# Patient Record
Sex: FEMALE | Race: WHITE | NOT HISPANIC OR LATINO | Employment: STUDENT | URBAN - METROPOLITAN AREA
[De-identification: names, ages, dates, MRNs, and addresses within clinical notes are randomized per-mention and may not be internally consistent; named-entity substitution may affect disease eponyms.]

---
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
## 2023-03-14 ENCOUNTER — MYC MEDICAL ADVICE (OUTPATIENT)
Dept: FAMILY MEDICINE | Facility: CLINIC | Age: 18
End: 2023-03-14
Payer: COMMERCIAL

## 2023-03-29 ENCOUNTER — E-VISIT (OUTPATIENT)
Dept: URGENT CARE | Facility: CLINIC | Age: 18
End: 2023-03-29
Payer: COMMERCIAL

## 2023-03-29 DIAGNOSIS — R35.0 URINARY FREQUENCY: Primary | ICD-10-CM

## 2023-03-29 PROCEDURE — 99421 OL DIG E/M SVC 5-10 MIN: CPT | Performed by: PHYSICIAN ASSISTANT

## 2023-03-29 NOTE — PATIENT INSTRUCTIONS
Dear Dulce Sharma,     After reviewing your responses, I would like you to come in for a urine test to make sure we treat you correctly. This urine test is to evaluate you for a possible urinary tract infection, and should be scheduled for today or tomorrow. Schedule a Lab Only appointment here.     Lab appointments are not available at most locations on the weekends. If no Lab Only appointment is available, you should be seen in any of our convenient Walk-in or Urgent Care Centers, which can be found on our website here.     You will receive instructions with your results in [facility] [facility] once they are available.     If your symptoms worsen, you develop pain in your back or stomach, develop fevers, or are not improving in 5 days, please contact your primary care provider for an appointment or visit a Walk-in or Urgent Care Center to be seen.     Thanks again for choosing us as your health care partner,     eKna Quiles PA-C

## 2023-03-30 ENCOUNTER — MYC MEDICAL ADVICE (OUTPATIENT)
Dept: FAMILY MEDICINE | Facility: CLINIC | Age: 18
End: 2023-03-30

## 2023-03-30 ENCOUNTER — LAB (OUTPATIENT)
Dept: LAB | Facility: CLINIC | Age: 18
End: 2023-03-30
Payer: COMMERCIAL

## 2023-03-30 DIAGNOSIS — R35.0 URINARY FREQUENCY: ICD-10-CM

## 2023-03-30 LAB
ALBUMIN UR-MCNC: NEGATIVE MG/DL
APPEARANCE UR: CLEAR
BACTERIA #/AREA URNS HPF: ABNORMAL /HPF
BILIRUB UR QL STRIP: NEGATIVE
CLUE CELLS: ABNORMAL
COLOR UR AUTO: YELLOW
GLUCOSE UR STRIP-MCNC: NEGATIVE MG/DL
HGB UR QL STRIP: ABNORMAL
KETONES UR STRIP-MCNC: NEGATIVE MG/DL
LEUKOCYTE ESTERASE UR QL STRIP: ABNORMAL
NITRATE UR QL: NEGATIVE
PH UR STRIP: 6 [PH] (ref 5–8)
RBC #/AREA URNS AUTO: ABNORMAL /HPF
SP GR UR STRIP: 1.01 (ref 1–1.03)
SQUAMOUS #/AREA URNS AUTO: ABNORMAL /LPF
TRICHOMONAS, WET PREP: ABNORMAL
UROBILINOGEN UR STRIP-ACNC: 0.2 E.U./DL
WBC #/AREA URNS AUTO: ABNORMAL /HPF
WBC'S/HIGH POWER FIELD, WET PREP: ABNORMAL
YEAST, WET PREP: ABNORMAL

## 2023-03-30 PROCEDURE — 87210 SMEAR WET MOUNT SALINE/INK: CPT | Performed by: PHYSICIAN ASSISTANT

## 2023-03-30 PROCEDURE — 87591 N.GONORRHOEAE DNA AMP PROB: CPT

## 2023-03-30 PROCEDURE — 87491 CHLMYD TRACH DNA AMP PROBE: CPT

## 2023-03-30 PROCEDURE — 81001 URINALYSIS AUTO W/SCOPE: CPT

## 2023-03-30 ASSESSMENT — ASTHMA QUESTIONNAIRES
QUESTION_2 LAST FOUR WEEKS HOW OFTEN HAVE YOU HAD SHORTNESS OF BREATH: ONCE OR TWICE A WEEK
QUESTION_5 LAST FOUR WEEKS HOW WOULD YOU RATE YOUR ASTHMA CONTROL: WELL CONTROLLED
ACT_TOTALSCORE: 21
ACT_TOTALSCORE: 21
QUESTION_3 LAST FOUR WEEKS HOW OFTEN DID YOUR ASTHMA SYMPTOMS (WHEEZING, COUGHING, SHORTNESS OF BREATH, CHEST TIGHTNESS OR PAIN) WAKE YOU UP AT NIGHT OR EARLIER THAN USUAL IN THE MORNING: NOT AT ALL
QUESTION_1 LAST FOUR WEEKS HOW MUCH OF THE TIME DID YOUR ASTHMA KEEP YOU FROM GETTING AS MUCH DONE AT WORK, SCHOOL OR AT HOME: NONE OF THE TIME
QUESTION_4 LAST FOUR WEEKS HOW OFTEN HAVE YOU USED YOUR RESCUE INHALER OR NEBULIZER MEDICATION (SUCH AS ALBUTEROL): TWO OR THREE TIMES PER WEEK

## 2023-03-31 LAB
C TRACH DNA SPEC QL NAA+PROBE: NEGATIVE
N GONORRHOEA DNA SPEC QL NAA+PROBE: NEGATIVE

## 2023-04-06 ENCOUNTER — OFFICE VISIT (OUTPATIENT)
Dept: FAMILY MEDICINE | Facility: CLINIC | Age: 18
End: 2023-04-06
Payer: COMMERCIAL

## 2023-04-06 VITALS
TEMPERATURE: 98.2 F | OXYGEN SATURATION: 98 % | BODY MASS INDEX: 20.66 KG/M2 | HEART RATE: 58 BPM | WEIGHT: 144.3 LBS | DIASTOLIC BLOOD PRESSURE: 58 MMHG | SYSTOLIC BLOOD PRESSURE: 92 MMHG | RESPIRATION RATE: 20 BRPM | HEIGHT: 70 IN

## 2023-04-06 DIAGNOSIS — L70.0 ACNE VULGARIS: Primary | ICD-10-CM

## 2023-04-06 PROCEDURE — 99213 OFFICE O/P EST LOW 20 MIN: CPT | Performed by: FAMILY MEDICINE

## 2023-04-06 RX ORDER — TRETINOIN 0.25 MG/G
CREAM TOPICAL AT BEDTIME
Qty: 20 G | Refills: 1 | Status: SHIPPED | OUTPATIENT
Start: 2023-04-06 | End: 2024-02-13

## 2023-04-06 ASSESSMENT — PAIN SCALES - GENERAL: PAINLEVEL: NO PAIN (0)

## 2023-04-06 NOTE — PROGRESS NOTES
A/P:      ICD-10-CM    1. Acne vulgaris  L70.0 tretinoin (RETIN-A) 0.025 % external cream        Reviewed management.  Advised pt to continue current nonfoaming cleanser and moisturizer.  Plan to stop benzoyl peroxide product and begin retin A.  Reviewed that can be 8 weeks for full effect and some people will noticed worsening (increased redness, dryness, irritation) when first starting.  Reviewed importance of sunscreen and moisture.    Plan f/u 8 weeks, sooner if needed      Subjective   arpita is a 17 year old, presenting for the following health issues:  Derm Problem        4/6/2023     4:13 PM   Additional Questions   Roomed by Gladis WISDOM     History of Present Illness       Reason for visit:  New skin concerns and a growing mole    She eats 2-3 servings of fruits and vegetables daily.She consumes 2 sweetened beverage(s) daily.She exercises with enough effort to increase her heart rate 60 or more minutes per day.  She exercises with enough effort to increase her heart rate 5 days per week. She is missing 1 dose(s) of medications per week.  She is not taking prescribed medications regularly due to remembering to take.       Concern - Skin concerns   Onset: acne on face   Description: Used to be on the pill and in November 2022 switched to IUD, she has noticed more acne on her face.  Also  Has a mole on her lower right cheek she would like checked out.   Intensity: moderate  Progression of Symptoms:  same and constant  Accompanying Signs & Symptoms: none  Previous history of similar problem: none  Precipitating factors:        Worsened by: none  Alleviating factors:        Improved by: none   Therapies tried and outcome: cera ve cleanser, panoxyl cleanser have not helped     Breakouts are mostly bumpy/rough.  No red, inflamed or painful nodules.  Only on face, no trouble on chest or back      Review of Systems         Objective    BP 92/58   Pulse 58   Temp 98.2  F (36.8  C) (Tympanic)   Resp 20   Ht 1.784 m  "(5' 10.25\")   Wt 65.5 kg (144 lb 4.8 oz)   LMP 03/11/2023 (Exact Date)   SpO2 98%   BMI 20.56 kg/m    Body mass index is 20.56 kg/m .  Physical Exam   PE:  VS as above   Gen:  WN/WD/WH female in NAD   SKin:  Multiple closed comedones on forehead primarily, no inflamed or pustular lesions noted.    Epic reviewed                "

## 2023-06-26 ENCOUNTER — PATIENT OUTREACH (OUTPATIENT)
Dept: CARE COORDINATION | Facility: CLINIC | Age: 18
End: 2023-06-26
Payer: COMMERCIAL

## 2023-07-10 ENCOUNTER — PATIENT OUTREACH (OUTPATIENT)
Dept: CARE COORDINATION | Facility: CLINIC | Age: 18
End: 2023-07-10
Payer: COMMERCIAL

## 2023-07-31 ENCOUNTER — E-VISIT (OUTPATIENT)
Dept: FAMILY MEDICINE | Facility: CLINIC | Age: 18
End: 2023-07-31
Payer: COMMERCIAL

## 2023-07-31 DIAGNOSIS — J45.909 MILD ASTHMA WITHOUT COMPLICATION, UNSPECIFIED WHETHER PERSISTENT: Primary | ICD-10-CM

## 2023-07-31 PROCEDURE — 99207 PR NON-BILLABLE SERV PER CHARTING: CPT | Performed by: FAMILY MEDICINE

## 2023-08-01 ENCOUNTER — VIRTUAL VISIT (OUTPATIENT)
Dept: FAMILY MEDICINE | Facility: CLINIC | Age: 18
End: 2023-08-01
Payer: COMMERCIAL

## 2023-08-01 DIAGNOSIS — J45.30 MILD PERSISTENT ASTHMA, UNSPECIFIED WHETHER COMPLICATED: ICD-10-CM

## 2023-08-01 PROCEDURE — 99213 OFFICE O/P EST LOW 20 MIN: CPT | Mod: VID | Performed by: FAMILY MEDICINE

## 2023-08-01 RX ORDER — FLUTICASONE PROPIONATE 50 MCG
1 BLISTER, WITH INHALATION DEVICE INHALATION EVERY 12 HOURS
Qty: 60 EACH | Refills: 0 | Status: SHIPPED | OUTPATIENT
Start: 2023-08-01

## 2023-08-01 RX ORDER — ALBUTEROL SULFATE 90 UG/1
2 AEROSOL, METERED RESPIRATORY (INHALATION) EVERY 6 HOURS PRN
Qty: 18 G | Refills: 2 | Status: SHIPPED | OUTPATIENT
Start: 2023-08-01

## 2023-08-01 ASSESSMENT — ASTHMA QUESTIONNAIRES: ACT_TOTALSCORE: 21

## 2023-08-01 NOTE — LETTER
My Asthma Action Plan    Name: Dulce Sharma   YOB: 2005  Date: 8/1/2023   My doctor: Samantha Boone, DO   My clinic: [facility]        My Rescue Medicine:   Albuterol inhaler (Proair/Ventolin/Proventil HFA)  2-4 puffs EVERY 4 HOURS as needed. Use a spacer if recommended by your provider.   My Asthma Severity:   Intermittent / Exercise Induced  Know your asthma triggers:   exercise or sports          GREEN ZONE   Good Control  I feel good  No cough or wheeze  Can work, sleep and play without asthma symptoms       Take your asthma control medicine every day.     If exercise triggers your asthma, take your rescue medication  15 minutes before exercise or sports, and  During exercise if you have asthma symptoms  Spacer to use with inhaler: If you have a spacer, make sure to use it with your inhaler             YELLOW ZONE Getting Worse  I have ANY of these:  I do not feel good  Cough or wheeze  Chest feels tight  Wake up at night   Keep taking your Green Zone medications  Start taking your rescue medicine:  every 20 minutes for up to 1 hour. Then every 4 hours for 24-48 hours.  If you stay in the Yellow Zone for more than 12-24 hours, contact your doctor.  If you do not return to the Green Zone in 12-24 hours or you get worse, start taking your oral steroid medicine if prescribed by your provider.           RED ZONE Medical Alert - Get Help  I have ANY of these:  I feel awful  Medicine is not helping  Breathing getting harder  Trouble walking or talking  Nose opens wide to breathe       Take your rescue medicine NOW  If your provider has prescribed an oral steroid medicine, start taking it NOW  Call your doctor NOW  If you are still in the Red Zone after 20 minutes and you have not reached your doctor:  Take your rescue medicine again and  Call 911 or go to the emergency room right away    See your regular doctor within 2 weeks of an Emergency Room or Urgent Care visit for  follow-up treatment.          Annual Reminders:  Meet with Asthma Educator,  Flu Shot in the Fall, consider Pneumonia Vaccination for patients with asthma (aged 19 and older).    Pharmacy:    [facility] [place] [place] [address] [facility] [place], [place] - [address] [address]    Electronically signed by Samantha Boone,    Date: 08/01/23                    Asthma Triggers  How To Control Things That Make Your Asthma Worse    Triggers are things that make your asthma worse.  Look at the list below to help you find your triggers and   what you can do about them. You can help prevent asthma flare-ups by staying away from your triggers.      Trigger                                                          What you can do   Cigarette Smoke  Tobacco smoke can make asthma worse. Do not allow smoking in your home, car or around you.  Be sure no one smokes at a child s day care or school.  If you smoke, ask your health care provider for ways to help you quit.  Ask family members to quit too.  Ask your health care provider for a referral to Quit Plan to help you quit smoking, or call 3-530-527-PLAN.     Colds, Flu, Bronchitis  These are common triggers of asthma. Wash your hands often.  Don t touch your eyes, nose or mouth.  Get a flu shot every year.     Dust Mites  These are tiny bugs that live in cloth or carpet. They are too small to see. Wash sheets and blankets in hot water every week.   Encase pillows and mattress in dust mite proof covers.  Avoid having carpet if you can. If you have carpet, vacuum weekly.   Use a dust mask and HEPA vacuum.   Pollen and Outdoor Mold  Some people are allergic to trees, grass, or weed pollen, or molds. Try to keep your windows closed.  Limit time out doors when pollen count is high.   Ask you health care provider about taking medicine during allergy season.     Animal Dander  Some people are allergic to skin flakes, urine or saliva from pets  with fur or feathers. Keep pets with fur or feathers out of your home.    If you can t keep the pet outdoors, then keep the pet out of your bedroom.  Keep the bedroom door closed.  Keep pets off cloth furniture and away from stuffed toys.     Mice, Rats, and Cockroaches  Some people are allergic to the waste from these pests.   Cover food and garbage.  Clean up spills and food crumbs.  Store grease in the refrigerator.   Keep food out of the bedroom.   Indoor Mold  This can be a trigger if your home has high moisture. Fix leaking faucets, pipes, or other sources of water.   Clean moldy surfaces.  Dehumidify basement if it is damp and smelly.   Smoke, Strong Odors, and Sprays  These can reduce air quality. Stay away from strong odors and sprays, such as perfume, powder, hair spray, paints, smoke incense, paint, cleaning products, candles and new carpet.   Exercise or Sports  Some people with asthma have this trigger. Be active!  Ask your doctor about taking medicine before sports or exercise to prevent symptoms.    Warm up for 5-10 minutes before and after sports or exercise.     Other Triggers of Asthma  Cold air:  Cover your nose and mouth with a scarf.  Sometimes laughing or crying can be a trigger.  Some medicines and food can trigger asthma.

## 2023-08-01 NOTE — LETTER
2023      To whom it may concern,       Dulce Sharma, AVIVA 2005, is currently under my medical care for treatment of her asthma.  She was diagnosed with exercise induced asthma based on symptoms in .  Since then she has used albuterol prior to participation in sports with good control of her exercise induced symptoms.       She currently uses 2-4 puffs of albuterol 30 minutes prior to play.       Please let us know if additional information is needed.    Sincerely,       Dr. Samantha Boone DO

## 2023-08-01 NOTE — PROGRESS NOTES
arpita is a 17 year old who is being evaluated via a billable video visit.      How would you like to obtain your AVS? [employer]  If the video visit is dropped, the invitation should be resent by: Text to cell phone: [phone]  Will anyone else be joining your video visit? No          A/P:      ICD-10-CM    1. Mild persistent asthma, unspecified whether complicated  J45.30 fluticasone (FLOVENT DISKUS) 50 MCG/ACT inhaler     albuterol (VENTOLIN HFA) 108 (90 Base) MCG/ACT inhaler        Perhaps has incomplete relief of symptoms with albuterol pre-exertion.  Seemed to do better on low dose controller last year.  Plan trial of low dose ICS and pre-exertion albuterol.  Follow-up in 1 month with update through [employer]    Sports letter completed.      Subjective   arpita is a 17 year old, presenting for the following health issues:  Asthma      History of Present Illness     Asthma:  She presents for follow up of asthma.  She has no cough, no wheezing, and no shortness of breath.  She is using a relief medication a few times a month. She does not miss any doses of her controller medication throughout the week. Patient is aware of the following triggers: exercise or sports and humidity. The patient has not had a visit to the Emergency Room, Urgent Care or Hospital due to asthma since the last clinic visit.     She eats 2-3 servings of fruits and vegetables daily.She consumes 0 sweetened beverage(s) daily.She exercises with enough effort to increase her heart rate 30 to 60 minutes per day.  She exercises with enough effort to increase her heart rate 5 days per week. She is missing 1 dose(s) of medications per week.  She is not taking prescribed medications regularly due to remembering to take.       Diagnosed in 2016 with exercise induced asthma.  Did not have formal testing done.  Had symptoms with exercise that resolved with albuterol prior to exercise    Had a flovent prescription last year as she was having issues  with SOB outside of exercise.  Has since run out of that medicine.  Has not noticed any recent breathing issues off the flovent.  Has been off controller medicine since December.    Uses albuterol 2 puffs every time she exercises.  If she knows she is going to play for 90 minutes she might do 2 puffs early and then 2 puffs 30 minutes prior to try to maximize benefit    Still feels like she has some symptoms if she is really exerting herself.  Did feel like the flovent really helped decrease exercise symptoms as well.  Would be interested in restarting the flovent      Review of Systems         Objective           Vitals:  No vitals were obtained today due to virtual visit.    Physical Exam   GENERAL: Healthy, alert and no distress  EYES: Eyes grossly normal to inspection.  No discharge or erythema, or obvious scleral/conjunctival abnormalities.  RESP: No audible wheeze, cough, or visible cyanosis.  No visible retractions or increased work of breathing.    SKIN: Visible skin clear. No significant rash, abnormal pigmentation or lesions.  NEURO: Cranial nerves grossly intact.  Mentation and speech appropriate for age.  PSYCH: Mentation appears normal, affect normal/bright, judgement and insight intact, normal speech and appearance well-groomed.    Epic reviewed            Video-Visit Details    Type of service:  Video Visit     Originating Location (pt. Location): Other work    Distant Location (provider location):  On-site  Platform used for Video Visit: Haydee

## 2023-08-31 DIAGNOSIS — F41.1 GAD (GENERALIZED ANXIETY DISORDER): ICD-10-CM

## 2023-08-31 NOTE — TELEPHONE ENCOUNTER
"Routing refill request to provider for review/approval because:  A break in medication        Requested Prescriptions   Pending Prescriptions Disp Refills    escitalopram (LEXAPRO) 10 MG tablet [Pharmacy Med Name: ESCITALOPRAM OXALATE 10MG TABS] 90 tablet 1     Sig: TAKE ONE TABLET BY MOUTH ONCE DAILY       SSRIs Protocol Passed - 8/31/2023  8:55 AM        Passed - Recent (12 mo) or future (30 days) visit within the authorizing provider's specialty     Patient has had an office visit with the authorizing provider or a provider within the authorizing providers department within the previous 12 mos or has a future within next 30 days. See \"Patient Info\" tab in inbasket, or \"Choose Columns\" in Meds & Orders section of the refill encounter.              Passed - Medication is active on med list        Passed - Patient is age 18 or older        Passed - No active pregnancy on record        Passed - No positive pregnancy test in last 12 months                 Sofi Carey RN 08/31/23 12:50 PM   "

## 2023-09-01 RX ORDER — ESCITALOPRAM OXALATE 10 MG/1
TABLET ORAL
Qty: 90 TABLET | Refills: 0 | Status: SHIPPED | OUTPATIENT
Start: 2023-09-01 | End: 2024-02-13

## 2023-09-01 NOTE — TELEPHONE ENCOUNTER
Pt states she wasn't taking the med between April and may but has now been taking it regularly since June.  Lori Brewer on 9/1/2023 at 10:11 AM

## 2023-09-01 NOTE — TELEPHONE ENCOUNTER
RN tried to reach patient.   No answer.   LVM to call back to [phone].   Sofi Carey RN on 9/1/2023 at 10:03 AM

## 2023-09-17 ENCOUNTER — HEALTH MAINTENANCE LETTER (OUTPATIENT)
Age: 18
End: 2023-09-17

## 2024-02-06 ENCOUNTER — MYC MEDICAL ADVICE (OUTPATIENT)
Dept: FAMILY MEDICINE | Facility: CLINIC | Age: 19
End: 2024-02-06
Payer: COMMERCIAL

## 2024-02-06 SDOH — HEALTH STABILITY: PHYSICAL HEALTH: ON AVERAGE, HOW MANY DAYS PER WEEK DO YOU ENGAGE IN MODERATE TO STRENUOUS EXERCISE (LIKE A BRISK WALK)?: 5 DAYS

## 2024-02-06 SDOH — HEALTH STABILITY: PHYSICAL HEALTH: ON AVERAGE, HOW MANY MINUTES DO YOU ENGAGE IN EXERCISE AT THIS LEVEL?: 60 MIN

## 2024-02-06 ASSESSMENT — SOCIAL DETERMINANTS OF HEALTH (SDOH): HOW OFTEN DO YOU GET TOGETHER WITH FRIENDS OR RELATIVES?: MORE THAN THREE TIMES A WEEK

## 2024-02-06 ASSESSMENT — ASTHMA QUESTIONNAIRES
QUESTION_2 LAST FOUR WEEKS HOW OFTEN HAVE YOU HAD SHORTNESS OF BREATH: ONCE OR TWICE A WEEK
QUESTION_3 LAST FOUR WEEKS HOW OFTEN DID YOUR ASTHMA SYMPTOMS (WHEEZING, COUGHING, SHORTNESS OF BREATH, CHEST TIGHTNESS OR PAIN) WAKE YOU UP AT NIGHT OR EARLIER THAN USUAL IN THE MORNING: NOT AT ALL
ACT_TOTALSCORE: 20
QUESTION_1 LAST FOUR WEEKS HOW MUCH OF THE TIME DID YOUR ASTHMA KEEP YOU FROM GETTING AS MUCH DONE AT WORK, SCHOOL OR AT HOME: SOME OF THE TIME
ACT_TOTALSCORE: 20
QUESTION_4 LAST FOUR WEEKS HOW OFTEN HAVE YOU USED YOUR RESCUE INHALER OR NEBULIZER MEDICATION (SUCH AS ALBUTEROL): ONCE A WEEK OR LESS
QUESTION_5 LAST FOUR WEEKS HOW WOULD YOU RATE YOUR ASTHMA CONTROL: WELL CONTROLLED

## 2024-02-12 NOTE — PATIENT INSTRUCTIONS
"Eating Healthy Foods: Care Instructions  With every meal, you can make healthy food choices. Try to eat a variety of fruits, vegetables, whole grains, lean proteins, and low-fat dairy products. This can help you get the right balance of nutrients, including vitamins and minerals. Small changes add up over time. You can start by adding one healthy food to your meals each day.    Try to make half your plate fruits and vegetables, one-fourth whole grains, and one-fourth lean proteins. Try including dairy with your meals.   Eat more fruits and vegetables. Try to have them with most meals and snacks.   Foods for healthy eating    Fruits    These can be fresh, frozen, canned, or dried.  Try to choose whole fruit rather than fruit juice.  Eat a variety of colors.    Vegetables    These can be fresh, frozen, canned, or dried.  Beans, peas, and lentils count too.    Whole grains    Choose whole-grain breads, cereals, and noodles.  Try brown rice.    Lean proteins    These can include lean meat, poultry, fish, and eggs.  You can also have tofu, beans, peas, lentils, nuts, and seeds.    Dairy    Try milk, yogurt, and cheese.  Choose low-fat or fat-free when you can.  If you need to, use lactose-free milk or fortified plant-based milk products, such as soy milk.    Water    Drink water when you're thirsty.  Limit sugar-sweetened drinks, including soda, fruit drinks, and sports drinks.  Where can you learn more?  Go to https://www.[employer].net/patiented  Enter T756 in the search box to learn more about \"Eating Healthy Foods: Care Instructions.\"  Current as of: February 28, 2023               Content Version: 13.8    7620-2155 [employer].   Care instructions adapted under license by your healthcare professional. If you have questions about a medical condition or this instruction, always ask your healthcare professional. [employer] disclaims any warranty or liability for your use of this " information.      Learning About Stress  What is stress?     Stress is your body's response to a hard situation. Your body can have a physical, emotional, or mental response. Stress is a fact of life for most people, and it affects everyone differently. What causes stress for you may not be stressful for someone else.  A lot of things can cause stress. You may feel stress when you go on a job interview, take a test, or run a race. This kind of short-term stress is normal and even useful. It can help you if you need to work hard or react quickly. For example, stress can help you finish an important job on time.  Long-term stress is caused by ongoing stressful situations or events. Examples of long-term stress include long-term health problems, ongoing problems at work, or conflicts in your family. Long-term stress can harm your health.  How does stress affect your health?  When you are stressed, your body responds as though you are in danger. It makes hormones that speed up your heart, make you breathe faster, and give you a burst of energy. This is called the fight-or-flight stress response. If the stress is over quickly, your body goes back to normal and no harm is done.  But if stress happens too often or lasts too long, it can have bad effects. Long-term stress can make you more likely to get sick, and it can make symptoms of some diseases worse. If you tense up when you are stressed, you may develop neck, shoulder, or low back pain. Stress is linked to high blood pressure and heart disease.  Stress also harms your emotional health. It can make you [place], tense, or depressed. Your relationships may suffer, and you may not do well at work or school.  What can you do to manage stress?  You can try these things to help manage stress:   Do something active. Exercise or activity can help reduce stress. Walking is a great way to get started. Even everyday activities such as housecleaning or yard work can help.  Try  yoga or roya chi. These techniques combine exercise and meditation. You may need some training at first to learn them.  Do something you enjoy. For example, listen to music or go to a movie. Practice your hobby or do volunteer work.  Meditate. This can help you relax, because you are not worrying about what happened before or what may happen in the future.  Do guided imagery. Imagine yourself in any setting that helps you feel calm. You can use online videos, books, or a teacher to guide you.  Do breathing exercises. For example:  From a standing position, bend forward from the waist with your knees slightly bent. Let your arms dangle close to the floor.  Breathe in slowly and deeply as you return to a standing position. Roll up slowly and lift your head last.  Hold your breath for just a few seconds in the standing position.  Breathe out slowly and bend forward from the waist.  Let your feelings out. Talk, laugh, cry, and express anger when you need to. Talking with supportive friends or family, a counselor, or a loy leader about your feelings is a healthy way to relieve stress. Avoid discussing your feelings with people who make you feel worse.  Write. It may help to write about things that are bothering you. This helps you find out how much stress you feel and what is causing it. When you know this, you can find better ways to cope.  What can you do to prevent stress?  You might try some of these things to help prevent stress:  Manage your time. This helps you find time to do the things you want and need to do.  Get enough sleep. Your body recovers from the stresses of the day while you are sleeping.  Get support. Your family, friends, and community can make a difference in how you experience stress.  Limit your news feed. Avoid or limit time on social media or news that may make you feel stressed.  Do something active. Exercise or activity can help reduce stress. Walking is a great way to get started.  Where  "can you learn more?  Go to https://www.[employer].net/patiented  Enter N032 in the search box to learn more about \"Learning About Stress.\"  Current as of: February 26, 2023               Content Version: 13.8    8688-1827 [employer].   Care instructions adapted under license by your healthcare professional. If you have questions about a medical condition or this instruction, always ask your healthcare professional. [employer] disclaims any warranty or liability for your use of this information.      Preventive Care Advice   This is general advice given by our system to help you stay healthy. However, your care team may have specific advice just for you. Please talk to your care team about your preventive care needs.  Nutrition  Eat 5 or more servings of fruits and vegetables each day.  Try wheat bread, brown rice and whole grain pasta (instead of white bread, rice, and pasta).  Get enough calcium and vitamin D. Check the label on foods and aim for 100% of the RDA (recommended daily allowance).  Lifestyle  Exercise at least 150 minutes each week  (30 minutes a day, 5 days a week).  Do muscle strengthening activities 2 days a week. These help control your weight and prevent disease.  No smoking.  Wear sunscreen to prevent skin cancer.  Have a dental exam and cleaning every 6 months.  Yearly exams  See your health care team every year to talk about:  Any changes in your health.  Any medicines your care team has prescribed.  Preventive care, family planning, and ways to prevent chronic diseases.  Shots (vaccines)   HPV shots (up to age 26), if you've never had them before.  Hepatitis B shots (up to age 59), if you've never had them before.  COVID-19 shot: Get this shot when it's due.  Flu shot: Get a flu shot every year.  Tetanus shot: Get a tetanus shot every 10 years.  Pneumococcal, hepatitis A, and RSV shots: Ask your care team if you need these based on your risk.  Shingles shot (for age " 50 and up)  General health tests  Diabetes screening:  Starting at age 35, Get screened for diabetes at least every 3 years.  If you are younger than age 35, ask your care team if you should be screened for diabetes.  Cholesterol test: At age 39, start having a cholesterol test every 5 years, or more often if advised.  Bone density scan (DEXA): At age 50, ask your care team if you should have this scan for osteoporosis (brittle bones).  Hepatitis C: Get tested at least once in your life.  STIs (sexually transmitted infections)  Before age 24: Ask your care team if you should be screened for STIs.  After age 24: Get screened for STIs if you're at risk. You are at risk for STIs (including HIV) if:  You are sexually active with more than one person.  You don't use condoms every time.  You or a partner was diagnosed with a sexually transmitted infection.  If you are at risk for HIV, ask about PrEP medicine to prevent HIV.  Get tested for HIV at least once in your life, whether you are at risk for HIV or not.  Cancer screening tests  Cervical cancer screening: If you have a cervix, begin getting regular cervical cancer screening tests starting at age 21.  Breast cancer scan (mammogram): If you've ever had breasts, begin having regular mammograms starting at age 40. This is a scan to check for breast cancer.  Colon cancer screening: It is important to start screening for colon cancer at age 45.  Have a colonoscopy test every 10 years (or more often if you're at risk) Or, ask your provider about stool tests like a FIT test every year or Cologuard test every 3 years.  To learn more about your testing options, visit:   https://www.[employer]/849282.pdf.  For help making a decision, visit:   https://bit.ly/sc09027.  Prostate cancer screening test: If you have a prostate, ask your care team if a prostate cancer screening test (PSA) at age 55 is right for you.  Lung cancer screening: If you are a current or former smoker ages  50 to 80, ask your care team if ongoing lung cancer screenings are right for you.  For informational purposes only. Not to replace the advice of your health care provider. Copyright   2023 [employer]. All rights reserved. Clinically reviewed by the [facility] Transitions Program. [employer] 305750 - REV 01/24.

## 2024-02-13 ENCOUNTER — OFFICE VISIT (OUTPATIENT)
Dept: FAMILY MEDICINE | Facility: CLINIC | Age: 19
End: 2024-02-13
Payer: COMMERCIAL

## 2024-02-13 VITALS
DIASTOLIC BLOOD PRESSURE: 60 MMHG | WEIGHT: 139.4 LBS | HEART RATE: 75 BPM | HEIGHT: 70 IN | TEMPERATURE: 97.4 F | RESPIRATION RATE: 16 BRPM | OXYGEN SATURATION: 100 % | BODY MASS INDEX: 19.96 KG/M2 | SYSTOLIC BLOOD PRESSURE: 92 MMHG

## 2024-02-13 DIAGNOSIS — R41.840 ATTENTION DEFICIT: ICD-10-CM

## 2024-02-13 DIAGNOSIS — Z00.00 ROUTINE GENERAL MEDICAL EXAMINATION AT A HEALTH CARE FACILITY: Primary | ICD-10-CM

## 2024-02-13 DIAGNOSIS — Z11.3 SCREEN FOR STD (SEXUALLY TRANSMITTED DISEASE): ICD-10-CM

## 2024-02-13 DIAGNOSIS — F41.1 GAD (GENERALIZED ANXIETY DISORDER): ICD-10-CM

## 2024-02-13 DIAGNOSIS — F43.21 GRIEF REACTION: ICD-10-CM

## 2024-02-13 DIAGNOSIS — Z11.4 SCREENING FOR HIV (HUMAN IMMUNODEFICIENCY VIRUS): ICD-10-CM

## 2024-02-13 DIAGNOSIS — Z11.59 NEED FOR HEPATITIS C SCREENING TEST: ICD-10-CM

## 2024-02-13 DIAGNOSIS — Z13.6 CARDIOVASCULAR SCREENING; LDL GOAL LESS THAN 100: ICD-10-CM

## 2024-02-13 LAB
CHOLEST SERPL-MCNC: 160 MG/DL
FASTING STATUS PATIENT QL REPORTED: YES
HDLC SERPL-MCNC: 72 MG/DL
HIV 1+2 AB+HIV1 P24 AG SERPL QL IA: NONREACTIVE
LDLC SERPL CALC-MCNC: 77 MG/DL
NONHDLC SERPL-MCNC: 88 MG/DL
T PALLIDUM AB SER QL: NONREACTIVE
TRIGL SERPL-MCNC: 53 MG/DL

## 2024-02-13 PROCEDURE — 90480 ADMN SARSCOV2 VAC 1/ONLY CMP: CPT | Performed by: FAMILY MEDICINE

## 2024-02-13 PROCEDURE — 87389 HIV-1 AG W/HIV-1&-2 AB AG IA: CPT | Performed by: FAMILY MEDICINE

## 2024-02-13 PROCEDURE — 86780 TREPONEMA PALLIDUM: CPT | Performed by: FAMILY MEDICINE

## 2024-02-13 PROCEDURE — 99395 PREV VISIT EST AGE 18-39: CPT | Mod: 25 | Performed by: FAMILY MEDICINE

## 2024-02-13 PROCEDURE — 87591 N.GONORRHOEAE DNA AMP PROB: CPT | Performed by: FAMILY MEDICINE

## 2024-02-13 PROCEDURE — 99214 OFFICE O/P EST MOD 30 MIN: CPT | Mod: 25 | Performed by: FAMILY MEDICINE

## 2024-02-13 PROCEDURE — 91320 SARSCV2 VAC 30MCG TRS-SUC IM: CPT | Performed by: FAMILY MEDICINE

## 2024-02-13 PROCEDURE — 90686 IIV4 VACC NO PRSV 0.5 ML IM: CPT | Performed by: FAMILY MEDICINE

## 2024-02-13 PROCEDURE — 36415 COLL VENOUS BLD VENIPUNCTURE: CPT | Performed by: FAMILY MEDICINE

## 2024-02-13 PROCEDURE — 87491 CHLMYD TRACH DNA AMP PROBE: CPT | Performed by: FAMILY MEDICINE

## 2024-02-13 PROCEDURE — 90471 IMMUNIZATION ADMIN: CPT | Performed by: FAMILY MEDICINE

## 2024-02-13 PROCEDURE — 80061 LIPID PANEL: CPT | Performed by: FAMILY MEDICINE

## 2024-02-13 RX ORDER — ESCITALOPRAM OXALATE 10 MG/1
10 TABLET ORAL DAILY
Qty: 90 TABLET | Refills: 0 | Status: SHIPPED | OUTPATIENT
Start: 2024-02-13 | End: 2024-04-05

## 2024-02-13 ASSESSMENT — ANXIETY QUESTIONNAIRES
2. NOT BEING ABLE TO STOP OR CONTROL WORRYING: MORE THAN HALF THE DAYS
5. BEING SO RESTLESS THAT IT IS HARD TO SIT STILL: MORE THAN HALF THE DAYS
GAD7 TOTAL SCORE: 11
GAD7 TOTAL SCORE: 11
3. WORRYING TOO MUCH ABOUT DIFFERENT THINGS: MORE THAN HALF THE DAYS
7. FEELING AFRAID AS IF SOMETHING AWFUL MIGHT HAPPEN: NOT AT ALL
6. BECOMING EASILY ANNOYED OR IRRITABLE: SEVERAL DAYS
1. FEELING NERVOUS, ANXIOUS, OR ON EDGE: NEARLY EVERY DAY

## 2024-02-13 ASSESSMENT — PATIENT HEALTH QUESTIONNAIRE - PHQ9
SUM OF ALL RESPONSES TO PHQ QUESTIONS 1-9: 6
5. POOR APPETITE OR OVEREATING: SEVERAL DAYS

## 2024-02-13 NOTE — PROGRESS NOTES
Preventive Care Visit  [facility]  Feb 13, 2024    A/P:      ICD-10-CM    1. Routine general medical examination at a health care facility  Z00.00       2. MAGDA (generalized anxiety disorder)  F41.1 escitalopram (LEXAPRO) 10 MG tablet     Adult Mental Health  Referral      3. Grief reaction  F43.21 Adult Mental Health  Referral      4. Attention deficit  R41.840 Adult Mental Health  Referral      5. Screening for HIV (human immunodeficiency virus)  Z11.4 HIV Antigen Antibody Combo      6. Need for hepatitis C screening test  Z11.59       7. Screen for STD (sexually transmitted disease)  Z11.3 NEISSERIA GONORRHOEA PCR     CHLAMYDIA TRACHOMATIS PCR     Treponema Abs w Reflex to RPR and Titer      8. CARDIOVASCULAR SCREENING; LDL GOAL LESS THAN 100  Z13.6 Lipid panel reflex to direct LDL Fasting        MAGDA:  previously controlled on lexapro, restart med.  Therapy referral placed.  Reviewed black box warning.  Follow-up 4-6 weeks    Grief:  reviewed grief vs depression.  Lost grandmother <8 weeks ago.  Therapy referral placed.    Attention concern:  states previously tested and not diagnosed as a child.  More concerns recently.  Requesting testing.  Referral placed.    Reviewed STD screening and lipid screening    Subjective   Dulce is a 18 year old, presenting for the following:  Physical        2/13/2024     7:11 AM   Additional Questions   Roomed by Johanny DAWKINS   Accompanied by self        Health Care Directive  Patient does not have a Health Care Directive or Living Will:     HPI    Concerns:  Anxiety  Consult for ADHD  Was taking the lexapro over the summer.  Fractured her tibia and dislocated her knee.  Continued the med through her recovery and then felt she did not need it anymore so stopped in Nov.    Then grandmother passed away in Jan, quit playing soccer and is transferring to a different school    Now anxiety is worse again and  "feel she might have some depression.  Would like to see a therapist.    Anxiety has been worse the last month.  Also feeling more down.  Sometimes feels happy but other days feels like she is \"faking it\".  This has been since her grandmother passed away.      Doing okay in classes but has a lot of test anxiety.  Boyfriend struggling mentally as well and wanted a break so anxiety about that as well.     Dad and sister with ADHD.  She did testing as a child and \"did not have it\".  Struggled in school last semester with focus and paying attention.  Procrastinates a lot and fidgets a lot.       Stopped taking the flovent in Sept when she stopped playing soccer.  Continues to use albuterol prn for SOB.  Generally uses once every 2 weeks.          2/6/2024   General Health   How would you rate your overall physical health? Excellent   Feel stress (tense, anxious, or unable to sleep) Rather much   (!) STRESS CONCERN      2/6/2024   Nutrition   Three or more servings of calcium each day? Yes   Diet: Regular (no restrictions)   How many servings of fruit and vegetables per day? (!) 2-3   How many sweetened beverages each day? 0-1         2/6/2024   Exercise   Days per week of moderate/strenous exercise 5 days   Average minutes spent exercising at this level 60 min         2/6/2024   Social Factors   Frequency of gathering with friends or relatives More than three times a week   Worry food won't last until get money to buy more No   Food not last or not have enough money for food? No   Do you have housing?  Yes   Are you worried about losing your housing? No   Lack of transportation? No   Unable to get utilities (heat,electricity)? Yes   Want help with housing or utility concern? No   (!) FINANCIAL RESOURCE STRAIN CONCERN      2/6/2024   Dental   Dentist two times every year? Yes         2/6/2024   TB Screening   Were you born outside of [place]?  No         Today's PHQ-2 Score:       2/12/2024     3:00 PM   PHQ-2 ( 1999 [employer]) " "  Q1: Little interest or pleasure in doing things 1   Q2: Feeling down, depressed or hopeless 1   PHQ-2 Score 2   Q1: Little interest or pleasure in doing things Several days   Q2: Feeling down, depressed or hopeless Several days   PHQ-2 Score 2           2/6/2024   Substance Use   Alcohol more than 3/day or more than 7/wk Not Applicable   Do you use any other substances recreationally? No     Social History     Tobacco Use    Smoking status: Never    Smokeless tobacco: Never    Tobacco comments:     no exposure   Vaping Use    Vaping Use: Never used   Substance Use Topics    Alcohol use: No    Drug use: No             2/6/2024   One time HIV Screening   Previous HIV test? No         2/6/2024   STI Screening   New sexual partner(s) since last STI/HIV test? No     History of abnormal Pap smear: NO - under age 21, PAP not appropriate for age             2/6/2024   Contraception/Family Planning   Questions about contraception or family planning No        Reviewed and updated as needed this visit by Provider   Tobacco  Allergies  Meds                Past Medical History:   Diagnosis Date    DIARRHEA NOS 05/25/2006    Sloppy stool syndrome per Peds GI. Resolved as of November 4, 2006.    Other convulsions     febrile seizure 2/2005    Uncomplicated asthma 2018     No past surgical history on file.      Review of Systems  Constitutional, HEENT, cardiovascular, pulmonary, gi and gu systems are negative, except as otherwise noted.     Objective    Exam  BP 92/60   Pulse 75   Temp 97.4  F (36.3  C) (Tympanic)   Resp 16   Ht 1.778 m (5' 10\")   Wt 63.2 kg (139 lb 6.4 oz)   LMP 01/19/2024   SpO2 100%   BMI 20.00 kg/m     Estimated body mass index is 20 kg/m  as calculated from the following:    Height as of this encounter: 1.778 m (5' 10\").    Weight as of this encounter: 63.2 kg (139 lb 6.4 oz).    Physical Exam  GENERAL: alert and no distress  EYES: Eyes grossly normal to inspection, PERRL and conjunctivae and " sclerae normal  HENT: ear canals and TM's normal, nose and mouth without ulcers or lesions  NECK: no adenopathy, no asymmetry, masses, or scars  RESP: lungs clear to auscultation - no rales, rhonchi or wheezes  CV: regular rate and rhythm, normal S1 S2, no S3 or S4, no murmur, click or rub, no peripheral edema  ABDOMEN: soft, nontender, no hepatosplenomegaly, no masses and bowel sounds normal  MS: no gross musculoskeletal defects noted, no edema  NEURO: Normal strength and tone, mentation intact and speech normal  PSYCH: mentation appears normal, affect normal/bright        Vision Screen       Hearing Screen         Signed Electronically by: Samantha Boone DO

## 2024-02-14 LAB
C TRACH DNA SPEC QL NAA+PROBE: NEGATIVE
N GONORRHOEA DNA SPEC QL NAA+PROBE: NEGATIVE

## 2024-02-22 ASSESSMENT — ANXIETY QUESTIONNAIRES
8. IF YOU CHECKED OFF ANY PROBLEMS, HOW DIFFICULT HAVE THESE MADE IT FOR YOU TO DO YOUR WORK, TAKE CARE OF THINGS AT HOME, OR GET ALONG WITH OTHER PEOPLE?: SOMEWHAT DIFFICULT
7. FEELING AFRAID AS IF SOMETHING AWFUL MIGHT HAPPEN: NOT AT ALL
5. BEING SO RESTLESS THAT IT IS HARD TO SIT STILL: MORE THAN HALF THE DAYS
IF YOU CHECKED OFF ANY PROBLEMS ON THIS QUESTIONNAIRE, HOW DIFFICULT HAVE THESE PROBLEMS MADE IT FOR YOU TO DO YOUR WORK, TAKE CARE OF THINGS AT HOME, OR GET ALONG WITH OTHER PEOPLE: SOMEWHAT DIFFICULT
4. TROUBLE RELAXING: NEARLY EVERY DAY
6. BECOMING EASILY ANNOYED OR IRRITABLE: MORE THAN HALF THE DAYS
1. FEELING NERVOUS, ANXIOUS, OR ON EDGE: MORE THAN HALF THE DAYS
GAD7 TOTAL SCORE: 14
3. WORRYING TOO MUCH ABOUT DIFFERENT THINGS: NEARLY EVERY DAY
7. FEELING AFRAID AS IF SOMETHING AWFUL MIGHT HAPPEN: NOT AT ALL
2. NOT BEING ABLE TO STOP OR CONTROL WORRYING: MORE THAN HALF THE DAYS
GAD7 TOTAL SCORE: 14
GAD7 TOTAL SCORE: 14

## 2024-02-28 ASSESSMENT — PATIENT HEALTH QUESTIONNAIRE - PHQ9
SUM OF ALL RESPONSES TO PHQ QUESTIONS 1-9: 6
10. IF YOU CHECKED OFF ANY PROBLEMS, HOW DIFFICULT HAVE THESE PROBLEMS MADE IT FOR YOU TO DO YOUR WORK, TAKE CARE OF THINGS AT HOME, OR GET ALONG WITH OTHER PEOPLE: SOMEWHAT DIFFICULT
SUM OF ALL RESPONSES TO PHQ QUESTIONS 1-9: 6

## 2024-02-29 ENCOUNTER — VIRTUAL VISIT (OUTPATIENT)
Dept: PSYCHOLOGY | Facility: CLINIC | Age: 19
End: 2024-02-29
Payer: COMMERCIAL

## 2024-02-29 DIAGNOSIS — F43.23 ADJUSTMENT DISORDER WITH MIXED ANXIETY AND DEPRESSED MOOD: Primary | ICD-10-CM

## 2024-02-29 PROCEDURE — 90834 PSYTX W PT 45 MINUTES: CPT | Mod: 95

## 2024-02-29 ASSESSMENT — COLUMBIA-SUICIDE SEVERITY RATING SCALE - C-SSRS
TOTAL  NUMBER OF INTERRUPTED ATTEMPTS LIFETIME: NO
2. HAVE YOU ACTUALLY HAD ANY THOUGHTS OF KILLING YOURSELF?: NO
6. HAVE YOU EVER DONE ANYTHING, STARTED TO DO ANYTHING, OR PREPARED TO DO ANYTHING TO END YOUR LIFE?: NO
1. HAVE YOU WISHED YOU WERE DEAD OR WISHED YOU COULD GO TO SLEEP AND NOT WAKE UP?: NO
TOTAL  NUMBER OF ABORTED OR SELF INTERRUPTED ATTEMPTS LIFETIME: NO
ATTEMPT LIFETIME: NO

## 2024-03-10 ASSESSMENT — PATIENT HEALTH QUESTIONNAIRE - PHQ9
10. IF YOU CHECKED OFF ANY PROBLEMS, HOW DIFFICULT HAVE THESE PROBLEMS MADE IT FOR YOU TO DO YOUR WORK, TAKE CARE OF THINGS AT HOME, OR GET ALONG WITH OTHER PEOPLE: SOMEWHAT DIFFICULT
SUM OF ALL RESPONSES TO PHQ QUESTIONS 1-9: 4
SUM OF ALL RESPONSES TO PHQ QUESTIONS 1-9: 4

## 2024-03-11 ENCOUNTER — VIRTUAL VISIT (OUTPATIENT)
Dept: PSYCHOLOGY | Facility: CLINIC | Age: 19
End: 2024-03-11
Payer: COMMERCIAL

## 2024-03-11 DIAGNOSIS — F43.22 ADJUSTMENT DISORDER WITH ANXIETY: Primary | ICD-10-CM

## 2024-03-11 PROCEDURE — 90791 PSYCH DIAGNOSTIC EVALUATION: CPT | Mod: 95 | Performed by: PSYCHOLOGIST

## 2024-03-11 ASSESSMENT — COLUMBIA-SUICIDE SEVERITY RATING SCALE - C-SSRS
1. HAVE YOU WISHED YOU WERE DEAD OR WISHED YOU COULD GO TO SLEEP AND NOT WAKE UP?: NO
6. HAVE YOU EVER DONE ANYTHING, STARTED TO DO ANYTHING, OR PREPARED TO DO ANYTHING TO END YOUR LIFE?: NO
TOTAL  NUMBER OF ABORTED OR SELF INTERRUPTED ATTEMPTS LIFETIME: NO
2. HAVE YOU ACTUALLY HAD ANY THOUGHTS OF KILLING YOURSELF?: NO
TOTAL  NUMBER OF INTERRUPTED ATTEMPTS LIFETIME: NO
ATTEMPT LIFETIME: NO

## 2024-03-11 ASSESSMENT — PATIENT HEALTH QUESTIONNAIRE - PHQ9: 5. POOR APPETITE OR OVEREATING: SEVERAL DAYS

## 2024-03-11 ASSESSMENT — ANXIETY QUESTIONNAIRES
1. FEELING NERVOUS, ANXIOUS, OR ON EDGE: MORE THAN HALF THE DAYS
6. BECOMING EASILY ANNOYED OR IRRITABLE: NOT AT ALL
7. FEELING AFRAID AS IF SOMETHING AWFUL MIGHT HAPPEN: NOT AT ALL
IF YOU CHECKED OFF ANY PROBLEMS ON THIS QUESTIONNAIRE, HOW DIFFICULT HAVE THESE PROBLEMS MADE IT FOR YOU TO DO YOUR WORK, TAKE CARE OF THINGS AT HOME, OR GET ALONG WITH OTHER PEOPLE: SOMEWHAT DIFFICULT
2. NOT BEING ABLE TO STOP OR CONTROL WORRYING: SEVERAL DAYS
5. BEING SO RESTLESS THAT IT IS HARD TO SIT STILL: SEVERAL DAYS
GAD7 TOTAL SCORE: 7
GAD7 TOTAL SCORE: 7
3. WORRYING TOO MUCH ABOUT DIFFERENT THINGS: MORE THAN HALF THE DAYS

## 2024-03-11 NOTE — PROGRESS NOTES
[facility]      PATIENT'S NAME: Dulce Sharma  PREFERRED NAME: Dulce  PRONOUNS:    She/Her  MRN: [id number]  : 2005  ADDRESS: [address]  [place] [address]  [facility] NUM[facility]:  [id number]  DATE OF SERVICE: 3/11/24  START TIME: 9:50  END TIME: 10:18  PREFERRED PHONE: [phone]  May we leave a program related message: Yes  EMERGENCY CONTACT: was not obtained  .  SERVICE MODALITY:  Video Visit:      Provider verified identity through the following two step process.  Patient provided:  Patient     Telemedicine Visit: The patient's condition can be safely assessed and treated via synchronous audio and visual telemedicine encounter.      Reason for Telemedicine Visit: Services only offered telehealth    Originating Site (Patient Location): Patient's home    Distant Site (Provider Location): Provider Remote Setting- Home Office    Consent:  The patient/guardian has verbally consented to: the potential risks and benefits of telemedicine (video visit) versus in person care; bill my insurance or make self-payment for services provided; and responsibility for payment of non-covered services.     Patient would like the video invitation sent by:  My Chart    Mode of Communication:  Video Conference via Donay    Distant Location (Provider):  Off-site    As the provider I attest to compliance with applicable laws and regulations related to telemedicine.    UNIVERSAL ADULT Mental Health DIAGNOSTIC ASSESSMENT    Identifying Information:  Patient is a 18 year old,   individual.  Patient was referred for an assessment by primary care provider.  Patient attended the session alone.    Chief Complaint:   The purpose of this evaluation is to: provide treatment recommendations and clarify diagnosis. Patient reported seeking services at this time for diagnostic assessment and recommendations for treatment.  Patient reported that she has completed a previous ADHD diagnostic assessment at  "the age of 6 .  Patient has not received a previous diagnosis of ADHD (her sister was diagnosed at age 12 and they recommended that Client be treated today). Patient reported that medication has not been prescribed medication to address these problems. Client is impulsive with spending and decision making. She is neat and organized and puts things away. Client has always been very fidgety. Her mother has always had to tell her to stop when she is fidgeting. Client can't retain information when she is learning. She has to work \"10 times harder\" to remember information. Once she gets to a test, she has test anxiety and \"it goes right out of my brain once I get to the test.\" As a child, she recalled having to re-read the same page 10 times in order to read what she read. She has been forgetful lately. When she is talking, she can't remember what she is trying to say and she will stop to think of the word. She will forget what she is trying to say. With regard to school, it is really hard to learn and retain information.    Social/Family History:  Patient reported they grew up in [place], [place] and [facility].  They were raised by biological parents; grandmother  .  Parents were always together.  She was the second born of two children. She has an older sister. Patient reported that their childhood was good. They would get dropped off from school at her grandmother's house. When they were in 4th grade, they moved in with her grandma (to help her financially). When Client was in 11th grade, her grandmother moved into a nursing home. Her family was loving and nurturing. When her sister was in high school, they would argue often. Patient described their current relationships with family of origin as good/close.      The patient describes their cultural background as .  Cultural influences and impact on patient's life structure, values, norms, and healthcare: NA.  Contextual influences on patient's health " "include: Contextual Factors: Family Factors close with family . These factors will be addressed in the Preliminary Treatment plan. Patient identified their preferred language to be English. Patient reported they does not need the assistance of an  or other support involved in therapy.     Patient reported had no significant delays in developmental tasks.   Patient's highest education level was high school graduate and some college.  Patient identified the following learning problems: none reported. When Client was in 3rd grade, she had to go in on Wednesdays for extra help with reading. In 4th and 5th grade, she was in a group that met in the mornings \"to help get extra learning in.\"  This group met to review different subjects each day. In 6th grade, she had to do \"math support.\" Modifications will not be used to assist communication in therapy.  Patient reports they are  able to understand written materials.    Patient reported the following relationship history: one dating relationship.  Patient's current relationship status is has a partner or significant other for 2 years. They get along well.   Patient identified their sexual orientation as heterosexual.  Patient reported having 0 child(joseph). Patient identified partner; parents; siblings; pets; other as part of their support system.  Patient identified the quality of these relationships as stable and meaningful,  .      Patient's current living/housing situation involves other.  She lives on campus with 5 roommates and she also stays at home with her parents and they report that housing is stable.    Patient is currently student. She is at [employer]. She will be transferring in the fall of 2024 to [employer]. She is in her second year of college (but credits-wise she is in her third year due to [employer] credits). Client is also working part-time. 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 Patient reports their finances are obtained through employment; parents. Patient does identify finances as a current stressor.      Patient reported that they have not been involved with the legal system.   Patient does not report being under probation/ parole/ jurisdiction.    Patient's Strengths and Limitations:  Patient identified the following strengths or resources that will help them succeed in treatment: commitment to health and well being, friends / good social support, positive school connection, motivation, sense of humor, strong social skills, and work ethic. Things that may interfere with the patient's success in treatment include: none identified.     Assessments:  The following assessments were completed by patient for this visit:  PHQ9:       12/23/2021     4:08 PM 3/3/2022     3:10 PM 2/13/2024     7:55 AM 2/28/2024    11:36 AM 3/10/2024     7:12 PM   PHQ-9 SCORE   PHQ-9 Total Score MyChart    6 (Mild depression) 4 (Minimal depression)   PHQ-9 Total Score 5 1 6 6 4     GAD7:       12/23/2021     4:08 PM 3/3/2022     3:10 PM 2/13/2024     7:55 AM 2/22/2024    11:09 AM 3/11/2024     9:58 AM   MAGDA-7 SCORE   Total Score    14 (moderate anxiety)    Total Score 15 2 11 14 7     PROMIS 10-Global Health (all questions and answers displayed):       2/22/2024    11:11 AM 3/4/2024     9:12 AM   PROMIS 10   In general, would you say your health is: Very good Very good   In general, would you say your quality of life is: Very good Excellent   In general, how would you rate your physical health? Very good Very good   In general, how would you rate your mental health, including your mood and your ability to think? Fair Fair   In general, how would you rate your satisfaction with your social activities and relationships? Very good Excellent   In general, please rate how well you carry out your usual social activities and roles Very good Fair   To what extent are you able to carry  out your everyday physical activities such as walking, climbing stairs, carrying groceries, or moving a chair? Mostly Completely   In the past 7 days, how often have you been bothered by emotional problems such as feeling anxious, depressed, or irritable? Sometimes Sometimes   In the past 7 days, how would you rate your fatigue on average? Moderate None   In the past 7 days, how would you rate your pain on average, where 0 means no pain, and 10 means worst imaginable pain? 4 0   In general, would you say your health is: 4 4   In general, would you say your quality of life is: 4 5   In general, how would you rate your physical health? 4 4   In general, how would you rate your mental health, including your mood and your ability to think? 2 2   In general, how would you rate your satisfaction with your social activities and relationships? 4 5   In general, please rate how well you carry out your usual social activities and roles. (This includes activities at home, at work and in your community, and responsibilities as a parent, child, spouse, employee, friend, etc.) 4 2   To what extent are you able to carry out your everyday physical activities such as walking, climbing stairs, carrying groceries, or moving a chair? 4 5   In the past 7 days, how often have you been bothered by emotional problems such as feeling anxious, depressed, or irritable? 3 3   In the past 7 days, how would you rate your fatigue on average? 3 1   In the past 7 days, how would you rate your pain on average, where 0 means no pain, and 10 means worst imaginable pain? 4 0   Global Mental Health Score 13 15   Global Physical Health Score 14 19   PROMIS TOTAL - SUBSCORES 27 34     [place] Suicide Severity Rating Scale (Lifetime/Recent)      2/29/2024    11:52 AM 3/11/2024    10:01 AM   [place] Suicide Severity Rating (Lifetime/Recent)   Q1 Wish to be Dead (Lifetime) N N   Q2 Non-Specific Active Suicidal Thoughts (Lifetime) N N   Actual Attempt  "(Lifetime) N N   Has subject engaged in non-suicidal self-injurious behavior? (Lifetime) N N   Interrupted Attempts (Lifetime) N N   Aborted or Self-Interrupted Attempt (Lifetime) N N   Preparatory Acts or Behavior (Lifetime) N N   Calculated C-SSRS Risk Score (Lifetime/Recent) No Risk Indicated No Risk Indicated       Answers submitted by the patient for this visit:  Patient Health Questionnaire (Submitted on 3/10/2024)  If you checked off any problems, how difficult have these problems made it for you to do your work, take care of things at home, or get along with other people?: Somewhat difficult  PHQ9 TOTAL SCORE: 4      Personal and Family Medical History:  Patient does not report a family history of mental health concerns.  Patient reports family history includes Allergies in her father and mother; C.A.D. in her paternal grandfather; Cancer in her paternal grandmother; Cerebrovascular Disease in her maternal grandfather; Hypertension in her maternal grandmother; Neurologic Disorder in her maternal grandfather and sister; Respiratory in her paternal grandfather..     Patient does report Mental Health Diagnosis and/or Treatment.  Patient reported the following previous diagnoses which include(s): an Anxiety Disorder.  Patient reported symptoms began 15. Client reported that she was starting her first year of [employer] (taking college classes). She was stressed about a certain assignment and the same day she had a hockey game and her teammate had a seizure on the ice and her heart stopped. Her friend \"basically  but they brought her back to life with CPR.\" That same week, her grandmother's health declined and she was moved to a nursing home (she had previously lived with Client and her family for 8 years).  Patient has received mental health services in the past:  medication from PCP  and therapy.  She started taking Lexapro at age 15. She stopped taking it in 2023 for a while. She restarted this " medication in February after her grandmother . 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 Psychiatric Hospitalizations: None.  Patient denies a history of civil commitment.  Patient is receiving other mental health services. These include  medications from PCP .  Client is prescribed Lexapro by PCP. Client has found medication to be helpful. Client just had her first therapy appointment last week with [facility] therapist, Belinda Donahue.    Patient has had a physical exam to rule out medical causes for current symptoms.  Date of last physical exam was within the past year. Client was encouraged to follow up with PCP if symptoms were to develop. The patient has a [facility] Provider, who is named Samantha Boone..  Patient reports no current medical concerns.  Patient denies any issues with pain.. There are not significant appetite / nutritional concerns / weight changes.   Patient does report a history of head injury / trauma / cognitive impairment.  She had a concussion at age 10 and again at age [age]. She denied LOC.    Patient reports current meds as:   Outpatient Medications Marked as Taking for the 3/11/24 encounter (Virtual Visit) with Luci Bowles, PhD   Medication Sig    albuterol (VENTOLIN HFA) 108 (90 Base) MCG/ACT inhaler Inhale 2 puffs into the lungs every 6 hours as needed for shortness of breath, wheezing or cough    escitalopram (LEXAPRO) 10 MG tablet Take 1 tablet (10 mg) by mouth daily    fluticasone (FLOVENT DISKUS) 50 MCG/ACT inhaler Inhale 1 puff into the lungs every 12 hours    levonorgestrel (MIRENA) 20 MCG/DAY IUD 1 each by Intrauterine route once Mirena IUD Inserted by Teresa Mcbride CNM on 2022  [employer]  Lot [id number]  Exp OCT 2024  [id number]    Multiple Vitamin (MULTIVITAMIN) per tablet Take 1 tablet by mouth daily.       Medication Adherence:  Patient reports taking.  taking prescribed medications as  prescribed.    Patient Allergies:    Allergies   Allergen Reactions    Cefzil [Cefprozil] Hives       Medical History:    Past Medical History:   Diagnosis Date    DIARRHEA NOS 05/25/2006    Sloppy stool syndrome per Peds GI. Resolved as of November 4, 2006.    Other convulsions     febrile seizure 2/2005    Uncomplicated asthma 2018         Current Mental Status Exam:   Appearance:  Appropriate    Eye Contact:  Good   Psychomotor:  Normal       Gait / station:  no problem  Attitude / Demeanor: Cooperative   Speech      Rate / Production: Normal/ Responsive      Volume:  Normal  volume      Language:  no problems and good  Mood:   Normal  Affect:   Appropriate    Thought Content: Clear   Thought Process: Goal Directed  Logical       Associations: No loosening of associations  Insight:   Good   Judgment:  Intact   Orientation:  All  Attention/concentration: Good    Substance Use:   Patient did report a family history of substance use concerns; see medical history section for details.  Patient has not received chemical dependency treatment in the past.  Patient has not ever been to detox.      Patient is not currently receiving any chemical dependency treatment.           Substance History of use Age of first use Date of last use     Pattern and duration of use (include amounts and frequency)   Alcohol never used       REPORTS SUBSTANCE USE: N/A   Cannabis   never used     REPORTS SUBSTANCE USE: N/A     Amphetamines   never used     REPORTS SUBSTANCE USE: N/A   Cocaine/crack    never used       REPORTS SUBSTANCE USE: N/A   Hallucinogens never used         REPORTS SUBSTANCE USE: N/A   Inhalants currently use   12  09/12/23  REPORTS SUBSTANCE USE: N/A   Heroin never used         REPORTS SUBSTANCE USE: N/A   Other Opiates never used     REPORTS SUBSTANCE USE: N/A   Benzodiazepine   never used     REPORTS SUBSTANCE USE: N/A   Barbiturates never used     REPORTS SUBSTANCE USE: N/A   Over the counter meds used in the past I  dont know 01/07/24 REPORTS SUBSTANCE USE: N/A   Caffeine currently use I dont know   REPORTS SUBSTANCE USE: reports using substance 1 times per day and has 1 coffee at a time.   Patient reports heaviest use is current use.   Nicotine  never used     REPORTS SUBSTANCE USE: N/A   Other substances not listed above:  Identify:  never used     REPORTS SUBSTANCE USE: N/A     Patient reported the following problems as a result of their substance use: no problems, not applicable.    Substance Use: No symptoms    Based on the negative CAGE score and clinical interview there  are not indications of drug or alcohol abuse.    Significant Losses / Trauma / Abuse / Neglect Issues:   Patient did not  serve in the .  There are indications or report of significant loss, trauma, abuse or neglect issues related to:  Death of grandmother in January 2024; hockey teammate had a medical emergency on the ice when Client was 15 years old  Concerns for possible neglect are not present.     Safety Assessment:   Patient denies current homicidal ideation and behaviors.  Patient denies current self-injurious ideation and behaviors.    Patient denied risk behaviors associated with substance use.   Patient denies any high risk behaviors associated with mental health symptoms.  Patient reports the following current concerns for their personal safety: None.  Patient reports there are not firearms in the house.         History of Safety Concerns:  Patient denied a history of homicidal ideation.     Patient denied a history of personal safety concerns.    Patient denied a history of assaultive behaviors.    Patient denied a history of sexual assault behaviors.     Patient denied a history of risk behaviors associated with substance use.  Patient denies any history of high risk behaviors associated with mental health symptoms.  Patient reports the following protective factors: forward or future oriented thinking; dedication to family or  friends; safe and stable environment; regular sleep; regular physical activity; purpose; secure attachment; living with other people; daily obligations; structured day; effective problem solving skills; commitment to well being; positive social skills; financial stability; strong sense of self worth or esteem    Risk Plan:  See Recommendations for Safety and Risk Management Plan    Review of Symptoms per patient report:   Depression: Change in sleep, Change in energy level, and Difficulties concentrating  Rebekah:  No Symptoms  Psychosis: No Symptoms  Anxiety: Excessive worry, Nervousness, Ruminations, Poor concentration, and Irritability  Panic:  Palpitations and Shortness of breath  Post Traumatic Stress Disorder:  Experienced traumatic event witnessed friend have a medical event on the ice (hockey game); death of grandma    Eating Disorder: No Symptoms  ADD / ADHD:  Inattentive, Distractibility, Forgetful, Impulsive, and Restlessness/fidgety  Conduct Disorder: No symptoms  Autism Spectrum Disorder: No symptoms  Obsessive Compulsive Disorder: No Symptoms    Patient reports the following compulsive behaviors and treatment history:  none reported .      Diagnostic Criteria:   Generalized Anxiety Disorder  A. Excessive anxiety and worry about a number of events or activities (such as work or school performance).   B. The person finds it difficult to control the worry.  C. Select 3 or more symptoms (required for diagnosis). Only one item is required in children.   - Restlessness or feeling keyed up or on edge.    - Difficulty concentrating or mind going blank.    - Sleep disturbance (difficulty falling or staying asleep, or restless unsatisfying sleep).   D. The focus of the anxiety and worry is not confined to features of an Axis I disorder.  E. The anxiety, worry, or physical symptoms cause clinically significant distress or impairment in social, occupational, or other important areas of functioning.   F. The  disturbance is not due to the direct physiological effects of a substance (e.g., a drug of abuse, a medication) or a general medical condition (e.g., hyperthyroidism) and does not occur exclusively during a Mood Disorder, a Psychotic Disorder, or a Pervasive Developmental Disorder.    Functional Status:  Patient reports the following functional impairments:  academic performance and management of the household and or completion of tasks.         Clinical Summary:  1. Psychosocial, Cultural and Contextual Factors: college student  .  2. Principal DSM5 Diagnoses  (Sustained by DSM5 Criteria Listed Above):   Adjustment Disorders  309.24 (F43.22) With anxiety.  RULE OUT: ADHD  5. Prognosis: Expect Improvement, Relieve Acute Symptoms, and Maintain Current Status / Prevent Deterioration.  6. Likely consequences of symptoms if not treated: issues at home/school.  7. Client strengths include:  educated, employed, goal-focused, good listener, has a previous history of therapy, insightful, intelligent, motivated, open to learning, open to suggestions / feedback, support of family, friends and providers, supportive, and wants to learn .     Recommendations:     1. Plan for Safety and Risk Management:   Safety and Risk: Recommended that patient call 911 or go to the local ED should there be a change in any of these risk factors..          Report to child / adult protection services was NA.        4. Resources/Service Plan:    services are not indicated.   Modifications to assist communication are not indicated.   Additional disability accommodations are not indicated.      5. Collaboration:   Collaboration / coordination of treatment will be initiated with the following  support professionals: primary care physician.      6.  Referrals:   The following referral(s) will be initiated:  NA .       A Release of Information has been obtained for the following:  NA .     Clinical Substantiation/medical necessity for the  above recommendations:  Medical necessity criteria is warranted in order to: Measure a psychological disorder and its severity and functional impairment to determine psychiatric diagnosis when a mental illness is suspected, or to achieve a differential diagnosis from a range of medical/psychological disorders that present with similar constellations of symptoms (e.g., determination and measurement of anxiety severity and impact in the presence of ongoing asthma or heart disease), Perform symptom measurement to objectively measure treatment effectiveness and/or determine the need to refer for pharmacological treatment or other medical evaluation (e.g., based on severity and chronicity of symptoms), and Evaluate primary symptoms of impaired attention and concentration that can occur in many neurological and psychiatric conditions. .    7. KRISTINA:    KRISTINA:  Discussed the general effects of drugs and alcohol on health and well-being. Provider gave patient printed information about the  effects of chemical use on their health and well being. Recommendations:  NA .     8. Records:   These were reviewed at time of assessment.   Information in this assessment was obtained from the medical record and  provided by patient who is a good historian.    Patient will have open access to their mental health medical record.    9.   Interactive Complexity: No    10. Safety Plan:  Patient denied any current/recent/lifetime history of suicidal ideation and/or behaviors.  No safety plan indicated at this time.     Provider Name/ Credentials:  Luci Bowles, PhD, LP  March 11, 2024

## 2024-03-12 ENCOUNTER — DOCUMENTATION ONLY (OUTPATIENT)
Dept: PSYCHOLOGY | Facility: CLINIC | Age: 19
End: 2024-03-12
Payer: COMMERCIAL

## 2024-03-12 NOTE — PROGRESS NOTES
Name:Pipe Sharma  MRN:? [id number]  :? 2005  ?   Client completed the [place] Multiphasic Personality Inventory-3 (MMPI-3), a self-report personality inventory, as part of her evaluation. Validity scales indicate that the client was able to comprehend and respond relevantly to the test items. Client responded in an open and consistent manner, resulting in a valid profile. Client reports vague neurological complaints and physical health concerns. She is reporting diffuse cognitive difficulties including problems with memory, confusion and attention and concentration. She reports being passive, indecisive, and inefficacious. She feels she is incapable of making decisions and dealing effectively with crisis situations. She reports an above average level of stress. She is prone to worry and rumination. She engages in compulsive behaviors including repetitive checking.

## 2024-03-20 ENCOUNTER — VIRTUAL VISIT (OUTPATIENT)
Dept: PSYCHOLOGY | Facility: CLINIC | Age: 19
End: 2024-03-20
Payer: COMMERCIAL

## 2024-03-20 ENCOUNTER — DOCUMENTATION ONLY (OUTPATIENT)
Dept: PSYCHOLOGY | Facility: CLINIC | Age: 19
End: 2024-03-20
Payer: COMMERCIAL

## 2024-03-20 DIAGNOSIS — F43.22 ADJUSTMENT DISORDER WITH ANXIETY: Primary | ICD-10-CM

## 2024-03-20 PROCEDURE — 90834 PSYTX W PT 45 MINUTES: CPT | Mod: 95

## 2024-03-20 NOTE — PROGRESS NOTES
[facility]                                     Progress Note    Patient Name: Dulce Sharma  Date: 3/20/2024         Service Type: Individual      Session Start Time: 11:03am  Session End Time: 11:48am     Session Length: 38-52 minutes    Session #: 2    Attendees: Client    Service Modality:  Video Visit:      Provider verified identity through the following two step process.  Patient provided:  Patient is known previously to provider    Telemedicine Visit: The patient's condition can be safely assessed and treated via synchronous audio and visual telemedicine encounter.      Reason for Telemedicine Visit: Services only offered telehealth    Originating Site (Patient Location): Patient's home    Distant Site (Provider Location): Provider Remote Setting- Home Office    Consent:  The patient/guardian has verbally consented to: the potential risks and benefits of telemedicine (video visit) versus in person care; bill my insurance or make self-payment for services provided; and responsibility for payment of non-covered services.     Patient would like the video invitation sent by:  My Chart    Mode of Communication:  Video Conference via Amwell    Distant Location (Provider):  Off-site    As the provider I attest to compliance with applicable laws and regulations related to telemedicine.    DATA  Interactive Complexity: No  Crisis: No        Progress Since Last Session (Related to Symptoms / Goals / Homework):   Symptoms: No change per patient reporting    Homework:  Assigned      Episode of Care Goals: Minimal progress - PREPARATION (Decided to change - considering how); Intervened by negotiating a change plan and determining options / strategies for behavior change, identifying triggers, exploring social supports, and working towards setting a date to begin behavior change     Current / Ongoing Stressors and Concerns:   Patient joined with therapist as they worked to identify goals and  objectives for therapy. Patient DA completed on 3/11/2024 by Luci Bowles, PhD.      Treatment Objective(s) Addressed in This Session:   Patient will identify 2-3 initial signs or symptoms of anxiety.  Patient will use cognitive strategies identified in therapy to challenge anxious thoughts.  Patient will limit amount of time spent on repetitive or obsessive thoughts to 15-20 minutes.     Intervention:   Therapist worked with patient to identify goals and objectives for therapy as they processed internal experience     Assessments completed prior to visit:  The following assessments were completed by patient for this visit:  PHQ9:       12/23/2021     4:08 PM 3/3/2022     3:10 PM 2/13/2024     7:55 AM 2/28/2024    11:36 AM 3/10/2024     7:12 PM   PHQ-9 SCORE   PHQ-9 Total Score MyChart    6 (Mild depression) 4 (Minimal depression)   PHQ-9 Total Score 5 1 6 6 4     GAD7:       12/23/2021     4:08 PM 3/3/2022     3:10 PM 2/13/2024     7:55 AM 2/22/2024    11:09 AM 3/11/2024     9:58 AM   MAGDA-7 SCORE   Total Score    14 (moderate anxiety)    Total Score 15 2 11 14 7     PROMIS 10-Global Health (only subscores and total score):       2/22/2024    11:11 AM 3/4/2024     9:12 AM 3/17/2024     2:42 PM   PROMIS-10 Scores Only   Global Mental Health Score 13 15 15   Global Physical Health Score 14 19 20   PROMIS TOTAL - SUBSCORES 27 34 35     Cooper Suicide Severity Rating Scale (Lifetime/Recent)      2/29/2024    11:52 AM 3/11/2024    10:01 AM   Cooper Suicide Severity Rating (Lifetime/Recent)   Q1 Wish to be Dead (Lifetime) N N   Q2 Non-Specific Active Suicidal Thoughts (Lifetime) N N   Actual Attempt (Lifetime) N N   Has subject engaged in non-suicidal self-injurious behavior? (Lifetime) N N   Interrupted Attempts (Lifetime) N N   Aborted or Self-Interrupted Attempt (Lifetime) N N   Preparatory Acts or Behavior (Lifetime) N N   Calculated C-SSRS Risk Score (Lifetime/Recent) No Risk Indicated No Risk Indicated          ASSESSMENT: Current Emotional / Mental Status (status of significant symptoms):   Risk status (Self / Other harm or suicidal ideation)   Patient denies current fears or concerns for personal safety.   Patient denies current or recent suicidal ideation or behaviors.   Patient denies current or recent homicidal ideation or behaviors.   Patient denies current or recent self injurious behavior or ideation.   Patient denies other safety concerns.   Patient reports there has been no change in risk factors since their last session.     Patient reports there has been no change in protective factors since their last session.     Recommended that patient call 911 or go to the local ED should there be a change in any of these risk factors.     Appearance:   Appropriate    Eye Contact:   Good    Psychomotor Behavior: Normal    Attitude:   Pleasant   Orientation:   All   Speech    Rate / Production: Normal/ Responsive    Volume:  Normal    Mood:    Anxious    Affect:    Appropriate    Thought Content:  Clear    Thought Form:  Coherent  Logical    Insight:    Good      Medication Review:   No changes to current psychiatric medication(s)     Medication Compliance:   Yes     Changes in Health Issues:   None reported     Chemical Use Review:   Substance Use: Chemical use reviewed, no active concerns identified      Tobacco Use: No current tobacco use.      Diagnosis:  1. Adjustment disorder with anxiety        Collateral Reports Completed:   Not Applicable    PLAN: (Patient Tasks / Therapist Tasks / Other)  Patient to engage in self-care coping skill of going on walk with friend. Next session 4/18/24.        TWYLA Rothman 3/20/24       Service Performed and Documented by RAFAELA-   Note reviewed and clinical supervision by TWYLA Hayden [facility] 3/22/2024                                                  ______________________________________________________________________    Individual Treatment Plan    Patient's  "Name: Dulce Sharma  YOB: 2005    Date of Creation: 3/20/2024 (Due 6/18/24)  Date Treatment Plan Last Reviewed/Revised: n/a    DSM5 Diagnoses: Adjustment Disorders  309.24 (F43.22) With anxiety  Psychosocial / Contextual Factors: Family: Close with family  PROMIS (reviewed every 90 days):     Referral / Collaboration:  None .    Anticipated number of session for this episode of care:  12-24  Anticipation frequency of session:  Every 3 weeks  Anticipated Duration of each session: 38-52 minutes  Treatment plan will be reviewed in 90 days or when goals have been changed.       MeasurableTreatment Goal(s) related to diagnosis / functional impairment(s)  Goal 1: Patient will report absence of persistent anxiety mood and report of reduced frequency and intensity of worry and absence of persistent anxious mood to acceptable levels, no panic attacks, report subjective comfort with rumination for a period of 90 days. Within 6 months as clinically observed and by patient self-report.    I will know I've met my goal when I am not down or too worked up on myself; I don't want be paralyzed in my own body.\"    Objective #A (Patient Action)    Patient will identify 2-3 initial signs or symptoms of anxiety.  Status: New - Date: 3/20/24      Intervention(s)  Therapist will teach the client how to perform a behavioral chain analysis with use of DBT interventions.    Objective #B  Patient will use cognitive strategies identified in therapy to challenge anxious thoughts.  Status: New - Date: 3/20/24      Intervention(s)  Therapist will teach emotional recognition/identification.     Objective #C  Patient will limit amount of time spent on repetitive or obsessive thoughts to 15-20 minutes.  Status: New - Date: 3/20/24      Intervention(s)  Therapist will teach emotional regulation skills.   .          Patient has reviewed and agreed to the above plan.      Belinda Donahue, TWYLA  March 20, 2024  Service Performed and " Documented by RAFAELA-   Tx plan reviewed and clinical supervision by TWYLA Hayden [facility] 3/22/2024

## 2024-03-20 NOTE — PROGRESS NOTES
"Name:Pipe Sharma  MRN:? [id number]  :? 2005     Rylee Adult ADHD Rating Scale-IV: Self and Other Reports (BAARS-IV)   The BAARS-IV assesses for symptoms of ADHD that are experienced in one's daily life. This assessment measure includes self and collateral rating scales designed to provide information regarding current and childhood symptoms of ADHD including inattention, hyperactivity, and impulsivity. Self-report scores are reported as percentiles. Scores at the 76th-83rd percentile are considered marginal, scores at the 84th-92nd percentile are considered borderline, scores at the 93rd-95th percentile are considered mild, scores at the 96th-98th percentile are considered moderate, and those at the 99th percentile are considered severe. Collateral or \"other\" rating scales are reported as number of symptoms observed in comparison to those reported by the client. Norms and percentile scores are not available for collateral reports.    ?   Current Symptoms Scale--Self Report:    Client completed the self-report inventory of current symptoms. The results indicate that the client's Total ADHD Score was 35 which places them in the 85th percentile for overall ADHD symptoms. In addition, the client endorsed the following occur \"often\" or \"very often\": 1/9 (86th percentile) Inattention symptoms, 2/9 (85th?percentile) Hyperactivity/Impulsivity symptoms, and 1/9 (78th percentile) Sluggish Cognitive Tempo symptoms. Overall, the results suggest the client is not reporting symptoms of inattention or hyperactivity/impulsivity at this time.    ?   Current Symptoms Scale--Other Report:   Client's boyfriend completed the collateral report inventory of current symptoms. Based on the collateral contact's observation of symptoms, the client demonstrates the following \"often\" or \"very often\": 1/9 Inattention symptoms, 1/5 Hyperactivity symptoms, 2/4 Impulsivity symptoms, and 2/9 Sluggish Cognitive Tempo symptoms. The " "client's Total ADHD Score was 38. The collateral- and self-report scores are similar. Her partner did not note symptoms of ADHD at this time.     Childhood Symptoms Scale--Self-Report:   Client completed the self-report inventory of childhood symptoms. The results indicate that the client's Total ADHD Score was 38 which places them in the 85th percentile for overall ADHD symptoms in childhood. In addition, the client endorsed having experienced the following \"often\" or \"very often\": 3/9 (85th percentile) Inattention symptoms and 3/9 (87th percentile) Hyperactivity-Impulsivity symptoms. Overall, the results suggest the client experienced borderline symptoms of inattention and borderline symptoms of hyperactivity/impulsivity in childhood.      Childhood Symptoms Scale--Other Report:   Client's mother completed the collateral report inventory of childhood symptoms. Based on the collateral contact's recollection of client's childhood symptoms, the client demonstrated the following \"often\" or \"very often\": 2/9 Inattention symptoms and 1/9 Hyperactivity-Impulsivity symptoms. The client's Total ADHD Score was 30. The collateral-report and self-report scores are similar. Her mother did not note symptoms of ADHD in childhood.  ?   Rylee Functional Impairment Scale: Self and Other Reports (BFIS)   The BFIS is used to assess an individuals' psychosocial impairment in major life/daily activities that may be due to a mental health disorder. This assessment measure includes self and collateral rating scales. Self-report scores are reported as percentiles. Scores at the 76th-83rd percentile are considered marginal, scores at the 84th-92nd percentile are considered borderline, scores at the 93rd-95th percentile are considered mild, scores at the 96th-98th percentile are considered moderate, and those at the 99th percentile are considered severe. Collateral or \"other\" rating scales are reported as number of symptoms observed in " "comparison to those reported by the client. Norms and percentile scores are not available for collateral reports.    ?   Results indicate the client did not identify impairment (scores at or greater than 93rd percentile) in any areas. The client's Mean Impairment Score was 0.64 (1-50th percentile) indicating the client is not reporting impairment in functioning across domains. Client's boyfriend completed the collateral rating scale, which indicated similar results (e.g., Mean Impairment Score of 0.64). He did not note impairment in any domains.     Rylee Deficits in Executive Functioning Scale (BDEFS)   The BDEFS is a measure used for evaluating dimensions of adult executive functioning in daily life. This assessment measure includes self and collateral rating scales. Self-report scores are reported as percentiles. Scores at the 76th-83rd percentile are considered marginal, scores at the 84th-92nd percentile are considered borderline, scores at the 93rd-95th percentile are considered mild, scores at the 96th-98th percentile are considered moderate, and those at the 99th percentile are considered severe. Collateral or \"other\" rating scales are reported as number of symptoms observed in comparison to those reported by the client. Norms and percentile scores are not available for collateral reports.    ?   Results indicate the client's Total Executive Functioning Score was 154 (51-75th percentile). The ADHD-Executive Functioning Index score was 19 (51-75th percentile). These scores suggest the client is not reporting deficits in executive functioning. Client's boyfriend completed the collateral report which indicated discrepant results. His scores were generally higher. He noted deficits in the areas of: self-organization/problem-solving and self-restraint.      Generalized Anxiety Disorder Questionnaire (MAGDA-7)   This questionnaire is designed to screen for anxiety in adults. Based on the client's score of 7, they " are reporting mild symptoms of anxiety at this time. Client identified the following symptoms of anxiety: feeling nervous/anxious/on edge; not being able to stop or control worrying; worrying too much about different things; restlessness, and becoming easily annoyed or irritable.  ?   Patient Health Questionnaire- 9 (PHQ-9)    This questionnaire is designed to screen for depression in adults. Based on the client's score of 2, they are not reporting symptoms of depression at this time.

## 2024-03-22 ENCOUNTER — VIRTUAL VISIT (OUTPATIENT)
Dept: PSYCHOLOGY | Facility: CLINIC | Age: 19
End: 2024-03-22
Payer: COMMERCIAL

## 2024-03-22 DIAGNOSIS — F43.22 ADJUSTMENT DISORDER WITH ANXIETY: Primary | ICD-10-CM

## 2024-03-22 PROCEDURE — 90832 PSYTX W PT 30 MINUTES: CPT | Mod: 95 | Performed by: PSYCHOLOGIST

## 2024-03-22 NOTE — PROGRESS NOTES
Progress Note       Client Name:               Dulce Sharma          Date:   3/22/2024         Service Type:             Individual      Telemedicine Visit: The patient's condition can be safely assessed and treated via synchronous audio and visual telemedicine encounter.        Reason for Telemedicine Visit: Services only offered telehealth      Originating Site (Patient Location): Patient's home            Distant Location (provider location):? Off-site      Consent:  The patient/guardian has verbally consented to: the potential risks and benefits of telemedicine (video visit) versus in person care; bill my insurance or make self-payment for services provided; and responsibility for payment of non-covered services.       Mode of Communication:? Video Conference via [employer]      As the provider I attest to compliance with applicable laws and regulations related to telemedicine.      Session Start Time:              10:55                          Session End Time: 11:20      Session Length:      25 minutes      Session #:     2       Attendees:     Client attended alone      Intervention: reviewed strategies for managing symptoms of anxiety; motivational interviewing: explored potential barriers for making healthy changes      Identifying Information:   Client is a 18 year old, , partnered / significant other female. Client was referred for a diagnostic assessment by PCP. The purpose of this evaluation is to: provide treatment recommendations and clarify diagnosis. Client is currently a student in college and reports she is able to function appropriately at school. Client attended the session alone.          Client's Statement of Presenting Concern:   Client reported seeking services at this time for diagnostic assessment and recommendations for treatment. Client's presenting concerns include:  Client is impulsive with spending and decision making (she will buy things online that she doesn't need  "(this can feel impulsive). She is neat and organized and puts things away. She misplaces her keys sometimes. Client has always been very fidgety. Her mother has always had to tell her to stop when she is fidgeting. Client can't retain information when she is learning. She has to work \"10 times harder\" to remember information. Once she gets to a test, she has test anxiety and \"it goes right out of my brain once I get to the test.\" As a child, she recalled having to re-read the same page 10 times in order to retain what she read. She has been forgetful lately. When she is talking, she can't remember what she is trying to say and she will stop to think of the word. She will forget what she is trying to say. With regard to school, it is really hard to learn and retain information. Client does not feel forgetful (I.e., she knows when deadlines are, when appointments are, what the assignments are), but she has difficulty memorizing information when she is studying. Client tries to finish one thing before moving onto the next. However, she might get sidetracked on the way to doing laundry (e.g., finding a plate) and she will stop to address that thing before getting to the laundry. Client likes to make plans and has a schedule and structure to her day. She used to procrastinate more when she was younger (e.g., didn't want to get out of bed or clean when she was younger), but now she likes having a \"set day.\" She has difficulty listening when people are talking to her if she is in the middle of doing something on her phone or computer (when she is in the middle of something, she can't focus on the interruption - it is hard to switch gears. She might interrupt people at times, but this isn't a big issue. Sometimes she feels that if she doesn't say something in the moment, she will forget what she is going to say. Client can relax and watch TV shows (when she has free time or feels bored). If a movie is not interesting, she " will  her phone. If she isn't interested in a book by the first chapter, she can't get into it. If hse likes the book, she can focus and read.  Client stated that symptoms have resulted in the following functional impairments: academic performance and management of the household and or completion of tasks.                  History of Presenting Concern:   CPatient reported that she has completed a previous ADHD diagnostic assessment at the age of 6. Patient has not received a previous diagnosis of ADHD (her sister was diagnosed when she was 10 years old and they recommended that Client be evaluated now). Client reported that medication has not been prescribed medication to address these problems. Client reported that these problems began college. Client has not attempted to resolve these concerns in the past. Client reported that other professional(s) are involved in providing support / services. These include  medications from PCP .  Client is prescribed Lexapro by PCP. Client has found medication to be helpful. Client just had her first therapy appointment last week with [facility] therapist, Belinda Donahue.          Social History:   As a child, client reported that she had problems getting ready for school in the morning and had problems with organization and keeping track of items. He had difficulty getting ready in the morning in high school. Her room was messy in middle school and high school, but it is not messy now. She would misplace things occasionally. Client reported no difficulty with childhood peer relationships.  As a child, client reported having regular and consistent sleep patterns.  Client reported currently experiencing regular and consistent sleep patterns. She had a few months where she had difficulty falling asleep (January and February 2024), but this is better now. Client reported sleeping approximately 7-9 hours per night.  Client reported that she has not completed a sleep study.   "Client reported having a well balanced diet.  There are not significant nutritional concerns.  Client reported sporadic exercise patterns.         Client's highest education level was high school graduate and some college. Client graduated high school in 2022. She estimated she obtained mostly As and Bs. During the elementary, middle, and high school years, patient recalls academic strengths in the area of reading, writing, and elective food courses and art classes. She also liked social studies. Client reported experiencing academic problems in math and science. Client did not identify any learning problems. Client did not receive tutoring services during the school years. Client did not receive special education services. When Client was in 3rd grade, she had to go in on Wednesdays for extra help with reading. In 4th and 5th grade, she was in a group that met in the mornings \"to help get extra learning in.\" This group met to review different subjects each day. In 6th grade, she had to do \"math support.\" Client reported no particular problems during the school years. In elementary school, she talked more than she was supposed to, but she did not do this in middle school or high school. In elementary school, there were 3 times when she had to be removed from the classroom \"to talk about my behavior\" (\"I would do something without thinking - but I grew out of that in 4th grade\"). Client was independent with managing homework. She would get things done on time and hand in assignments. If she missed an assignment, it was rare. She would end her school day, go to soccer/hockey and then go home and do homework. She had a set schedule that helped her to be productive. Sometimes Client would miss the bus in the morning if she didn't get out of bed on time, but she would make it to school on time. Client took notes during class, but didn't always understand and retain information. It was hard to follow along. Client felt " distracted if others were talking near her or if there were noises. If she wasn't interested in the topic, she would start thinking about something else. Sometimes she doodled. Client was taking [employer] classes during senior year.    Client did attend post-secondary school.  She is at [employer]. She will be transferring in the fall of 2024 to [employer]. She is in her second year of college (but credits-wise she is in her third year due to [employer] credits). She is majoring in exercise science. Client has been obtaining varying grades: in science classes such as biology and chemistry classes, she is struggling (she got a C+ in these classes last semester). In classes that are electives for her major (e.g., motor control, physiology of aging, research methods in exercise science, medical terminology) she is getting As. She does well in reading and writing classes. Client got a B in statistics and anatomy and physiology. Client is attending class regularly. Client is noticing that she can be distracted during class. She is taking notes, but she can't retain information and has to go back to review the notes several times to understand the material (this makes memorizing information for certain classes difficult). Client turns assignments in on time. She is able to meet deadlines and she keeps a good schedule to give herself enough time for all tasks. She does not feel she is forgetting assignments or procrastinating.         Client reported that she is currently employed part-time. 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 She will be a  at a country club this summer. Client reported that the current job is a good fit for her skills and personality.  Client reported that she frequently made mistakes with poor attention to detail and disorganized behavior . Sometimes she will misplace something or forget what someone  said after they gave her their order. In her  job, she might make small mistakes like forgetting a side for an order (occasionally). There are 9 minutes of downtime between the lunch and dinner rush so that can be boring. Usually, she is engaged and not bored. 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  Client has not been terminated from a place of employment.            Risk Taking Behaviors:   Client reported a history of the following risk taking behaviors: excessive spending - will buy things online that she doesn't need        Motor Vehicle Operation:   Client has received a 's license.  Client has not received any moving violations.  Client reported the following driving habits: attentive and cautious.  According to client, other people are comfortable riding as a passenger when she is driving.           Mental Status Assessment:   Appearance:                                 Appropriate    Eye Contact:                                 Good    Psychomotor Behavior:        Normal    Attitude:                                            Cooperative    Orientation:                                    All   Speech   Rate / Production:     Normal    Volume:                            Normal    Mood:                                                 Normal   Affect:                                                 Appropriate    Thought Content:                      Clear    Thought Form:                             Coherent  Logical    Insight:                                                              Good          Review of Symptoms:   Depression:     Change in sleep, Change in energy level, and Difficulties concentrating  Rebekah:             No Symptoms  Psychosis:       No Symptoms  Anxiety:           Excessive worry, Nervousness, Ruminations, Poor  concentration, and Irritability  Panic:              Palpitations and Shortness of breath  Post Traumatic Stress Disorder:  Experienced traumatic event witnessed friend have a medical event on the ice (hockey game); death of grandma    Eating Disorder:          No Symptoms  ADD / ADHD:              Inattentive, Distractibility, Forgetful, Impulsive, and Restlessness/fidgety  Conduct Disorder:       No symptoms  Autism Spectrum Disorder:     No symptoms  Obsessive Compulsive Disorder:       No Symptoms  Reckless Behavior:  Excessive Spending            Safety Issues and Plan for Safety and Risk Management:   Client denies a history of suicidal ideation, suicide attempts, self-injurious behavior, homicidal ideation, homicidal behavior, and and other safety concerns      Client denies current fears or concerns for personal safety.   Client denies current or recent suicidal ideation or behaviors.   Client denies current or recent homicidal ideation or behaviors.   Client denies current or recent self injurious behavior or ideation.   Client denies other safety concerns.   Client reports there are no firearms in the house.   Recommended that patient call 911 or go to the local ED should there be a change in any of these risk factors.            Diagnostic Criteria:   Attention Deficit Hyperactivity Disorder  A) A persistent pattern of inattention and/or hyperactivity-impulsivity that interferes with functioning or development, as characterized by (1) Inattention and/or (2) Hyperactivity and Impulsivity  - Often has difficulty sustaining attention in tasks or play activities  - Often fidgets with or taps hands or feet or squirms in seat       Generalized Anxiety Disorder  A. Excessive anxiety and worry about a number of events or activities (such as work or school performance).   B. The person finds it difficult to control the worry.  C. Select 3 or more symptoms (required for diagnosis). Only one item is required in  children.   - Restlessness or feeling keyed up or on edge.    - Difficulty concentrating or mind going blank.    - Sleep disturbance (difficulty falling or staying asleep, or restless unsatisfying sleep).   D. The focus of the anxiety and worry is not confined to features of an Axis I disorder.  E. The anxiety, worry, or physical symptoms cause clinically significant distress or impairment in social, occupational, or other important areas of functioning.   F. The disturbance is not due to the direct physiological effects of a substance (e.g., a drug of abuse, a medication) or a general medical condition (e.g., hyperthyroidism) and does not occur exclusively during a Mood Disorder, a Psychotic Disorder, or a Pervasive Developmental Disorder.      Functional Status:   Client's symptoms have caused reduced functional status in the following areas: academic performance and management of the household and or completion of tasks.             DSM-5Diagnoses: (Sustained by DSM5 Criteria Listed Above)      Adjustment Disorders  309.24 (F43.22) With anxiety.    RULE OUT: ADHD    Attendance Agreement:   Client has signed Attendance Agreement:No: unable to sign via telehealth         Preliminary Plan:   The client reports no currently identified Hindu, ethnic or cultural issues relevant to therapy.       services are not indicated.      Modifications to assist communication are not indicated.      Collaboration / coordination of treatment will be initiated with the following support professionals: primary care physician and outpatient therapist.      Referral to another professional/service is not indicated at this time..      A Release of Information is not needed at this time.      Client was given self and collaborative rating scales to be completed prior to the next appointment.  Client consented to sending/receiving these measures via email.  Depression and anxiety rating scales were completed.  A third  appointment was not scheduled at this time.       Report to child / adult protection services was NA.      Patient will have open access to their mental health medical record.      Luci Bowles, PhD, LP                         March 22, 2024

## 2024-03-26 ENCOUNTER — DOCUMENTATION ONLY (OUTPATIENT)
Dept: PSYCHOLOGY | Facility: CLINIC | Age: 19
End: 2024-03-26
Payer: COMMERCIAL

## 2024-03-26 NOTE — PROGRESS NOTES
CNS Vital Signs Neurocognitive Battery  The CNS Vital Signs Neurocognitive Battery is a remotely-administered assessment comprised of seven core subtests to individually measure the patient's verbal memory, visual memory, motor speed, psychomotor speed, reaction time, focus, ability to sustain attention and ability to adapt to changing rules and tasks.      Above average domain scores indicate a standard score (SS) greater than 109 or a Percentile Rank (AZ) greater than 74, indicating a high functioning test subject. Average is a SS  or AZ 25-74, indicating normal function. Low Average is a SS 80-89 or AZ 9-24 indicating a slight deficit or impairment. Below Average is a SS 70-79 or AZ 2-8, indicating a moderate level of deficit or impairment. Very Low is a SS less than 70 or a AZ less than 2, indicating a deficit and impairment.  Validity Indicator denotes a guideline for representing the possibility of an invalid test or domain score, and can be influenced by patient understanding, effort, or other conditions.    The patient's results are detailed below:    Domain Standard Score Percentile Description Validity   Neurocognitive Index 98 45 Average Y   Composite Memory Measure 96 40 Average Y   Verbal Memory 86 18 Low Average Y   Visual Memory 105 63 Average Y   Psychomotor Speed 107 68 Average Y   Reaction Time 88 21 Low Average Y   Complex Attention 101 53 Average Y   Cognitive Flexibility 99 47 Average Y   Processing Speed 95 37 Average Y   Executive Function 97 42 Average Y   Working Memory 105 63 Average Y   Sustained Attention 105 63 Average Y   Simple Attention 96 40 Average Y   Motor Speed 110 75 Above Average Y     Neurocognitive Index (NCI): Measures an average score derived from the domain scores or a general assessment of the overall neurocognitive status of the patient. The patient's NCI score is 98, with a percentile of 45, and falls within the Average range.    Composite Memory: Measures how  well subject can recognize, remember, and retrieve words and geometric figures, and is comprised of the Visual and Verbal Memory domains. The patient's Composite Memory score is 96, with a percentile of 40, and falls within the Average range.    Verbal Memory: Measures how well subject can recognize, remember, and retrieve words. The patient's Verbal Memory score is 86, with a percentile of 18, and falls within the Low Average range.    Visual Memory: Measures how well subject can recognize, remember and retrieve geometric figures. The patient's Visual Memory score is 105, with a percentile of 63, and falls within the Average range.    Psychomotor Speed: Measures how well a subject perceives, attends, responds to complex visual-perceptual information and performs simple fine motor coordination, and is comprised of the Motor Speed and Processing Speed indexes. The patient's Psychomotor Speed score is 107, with a percentile of 68, and falls within the Average range.    Reaction Time: Measures how quickly the subject can react, in milliseconds, to a simple and increasingly complex direction set. The patient's Reaction Time score is 88, with a percentile of 21, and falls within the Low Average range.    Complex Attention: Measures the ability to track and respond to a variety of stimuli over lengthy periods of time and/or perform complex mental tasks requiring vigilance quickly and accurately. The patient's Complex Attention score is 101, with a percentile of 53, and falls within the Average range.    Cognitive Flexibility: Measures how well subject is able to adapt to rapidly changing and increasingly complex set of directions and/or to manipulate the information. The patient's Cognitive Flexibility score is 99, with a percentile of 47, and falls within the Average range.    Processing Speed: Measures how well a subject recognizes and processes information i.e., perceiving, attending/responding to incoming information,  motor speed, fine motor coordination, and visual-perceptual ability. The patient's Processing Speed score is 95, with a percentile of 37, and falls within the Average range.    Executive Function: Measures how well a subject recognizes rules, categories, and manages or navigates rapid decision making. The patient's Executive Function score is 97, with a percentile of 42, and falls within the Average range.    Simple Attention: Measures the ability to track and respond to a single defined stimulus over lengthy periods of time while performing vigilance and response inhibition quickly and accurately to a simple task. The patient's Simple Attention score is 96, with a percentile of 40, and falls within the Average range.    Motor Speed: Measure: Ability to perform simple movements to produce and satisfy an intention towards a manual action and goal. The patient's Motor Speed score is 110, with a percentile of 75, and falls within the Above Average range.

## 2024-03-27 ENCOUNTER — DOCUMENTATION ONLY (OUTPATIENT)
Dept: PSYCHOLOGY | Facility: CLINIC | Age: 19
End: 2024-03-27
Payer: COMMERCIAL

## 2024-03-27 NOTE — PROGRESS NOTES
[facility]   ADHD Evaluation      Patient: Dulce Sharma   YOB: 2005  MRN: [id number]     Date(s) of assessment: Diagnostic Assessment (3/11/24; 3/22/24); MMPI-3 (Administered 3/11/24; Interpreted on 3/12/24); Rylee self-report and collateral measures scored and interpreted (3/20/24); CNS Vital signs (Administered 3/26/24; Interpreted on 3/26/24)      Information about appointment:   Client attended two sessions to aid in determining client's mental health diagnosis or diagnoses and treatment recommendations that best address client concerns. Available medical records were reviewed. There were no previous psychological evaluations for review. A diagnostic assessment was conducted at the initial appointment. Client completed several rating scales to assist in assessing attention-related and other mental health symptoms that may be causing impairments in functioning. Rating scales were also completed by a collateral contact. Personality testing was also completed to aid in diagnostic clarification.   ?   Assessment tools:    Rylee Adult ADHD Rating Scale-IV: Self and Other Reports (BAARS-IV), Rylee Functional Impairment Scale: Self and Other Reports (BFIS), Rylee Deficits in Executive Functioning Scale: Self and Other Reports (BDEFS), Patient Health Questionnaire-9 (PHQ-9), and Generalized Anxiety Disorder-7 (MAGDA-7); [place] Multiphasic Personality Inventory-Third Edition (MMPI-3); CNS Vital Signs *Testing administered remotely    ?   Assessment Results:   Behavioral Observations:   Client arrived to each session on-time. She was pleasant and cooperative at all times. Client did not demonstrate significant difficulties with inattention or hyperactivity/impulsivity during the sessions. The following results are likely to be an accurate reflection of Client's current functioning.      Rylee Adult ADHD Rating Scale-IV: Self and Other Reports (BAARS-IV)   The BAARS-IV assesses  "for symptoms of ADHD that are experienced in one's daily life. This assessment measure includes self and collateral rating scales designed to provide information regarding current and childhood symptoms of ADHD including inattention, hyperactivity, and impulsivity. Self-report scores are reported as percentiles. Scores at the 76th-83rd percentile are considered marginal, scores at the 84th-92nd percentile are considered borderline, scores at the 93rd-95th percentile are considered mild, scores at the 96th-98th percentile are considered moderate, and those at the 99th percentile are considered severe. Collateral or \"other\" rating scales are reported as number of symptoms observed in comparison to those reported by the client. Norms and percentile scores are not available for collateral reports.    ?   Current Symptoms Scale--Self Report:    Client completed the self-report inventory of current symptoms. The results indicate that the client's Total ADHD Score was 35 which places them in the 85th percentile for overall ADHD symptoms. In addition, the client endorsed the following occur \"often\" or \"very often\": 1/9 (86th percentile) Inattention symptoms, 2/9 (85th?percentile) Hyperactivity/Impulsivity symptoms, and 1/9 (78th percentile) Sluggish Cognitive Tempo symptoms. Overall, the results suggest the client is not reporting symptoms of inattention or hyperactivity/impulsivity at this time.    ?   Current Symptoms Scale--Other Report:   Client's boyfriend completed the collateral report inventory of current symptoms. Based on the collateral contact's observation of symptoms, the client demonstrates the following \"often\" or \"very often\": 1/9 Inattention symptoms, 1/5 Hyperactivity symptoms, 2/4 Impulsivity symptoms, and 2/9 Sluggish Cognitive Tempo symptoms. The client's Total ADHD Score was 38. The collateral- and self-report scores are similar. Her partner did not note symptoms of ADHD at this time.      Childhood " "Symptoms Scale--Self-Report:   Client completed the self-report inventory of childhood symptoms. The results indicate that the client's Total ADHD Score was 38 which places them in the 85th percentile for overall ADHD symptoms in childhood. In addition, the client endorsed having experienced the following \"often\" or \"very often\": 3/9 (85th percentile) Inattention symptoms and 3/9 (87th percentile) Hyperactivity-Impulsivity symptoms. Overall, the results suggest the client experienced borderline symptoms of inattention and borderline symptoms of hyperactivity/impulsivity in childhood.      Childhood Symptoms Scale--Other Report:   Client's mother completed the collateral report inventory of childhood symptoms. Based on the collateral contact's recollection of client's childhood symptoms, the client demonstrated the following \"often\" or \"very often\": 2/9 Inattention symptoms and 1/9 Hyperactivity-Impulsivity symptoms. The client's Total ADHD Score was 30. The collateral-report and self-report scores are similar. Her mother did not note symptoms of ADHD in childhood.  ?   Rylee Functional Impairment Scale: Self and Other Reports (BFIS)   The BFIS is used to assess an individuals' psychosocial impairment in major life/daily activities that may be due to a mental health disorder. This assessment measure includes self and collateral rating scales. Self-report scores are reported as percentiles. Scores at the 76th-83rd percentile are considered marginal, scores at the 84th-92nd percentile are considered borderline, scores at the 93rd-95th percentile are considered mild, scores at the 96th-98th percentile are considered moderate, and those at the 99th percentile are considered severe. Collateral or \"other\" rating scales are reported as number of symptoms observed in comparison to those reported by the client. Norms and percentile scores are not available for collateral reports.    ?   Results indicate the client did not " "identify impairment (scores at or greater than 93rd percentile) in any areas. The client's Mean Impairment Score was 0.64 (1-50th percentile) indicating the client is not reporting impairment in functioning across domains. Client's boyfriend completed the collateral rating scale, which indicated similar results (e.g., Mean Impairment Score of 0.64). He did not note impairment in any domains.     Rylee Deficits in Executive Functioning Scale (BDEFS)   The BDEFS is a measure used for evaluating dimensions of adult executive functioning in daily life. This assessment measure includes self and collateral rating scales. Self-report scores are reported as percentiles. Scores at the 76th-83rd percentile are considered marginal, scores at the 84th-92nd percentile are considered borderline, scores at the 93rd-95th percentile are considered mild, scores at the 96th-98th percentile are considered moderate, and those at the 99th percentile are considered severe. Collateral or \"other\" rating scales are reported as number of symptoms observed in comparison to those reported by the client. Norms and percentile scores are not available for collateral reports.    ?   Results indicate the client's Total Executive Functioning Score was 154 (51-75th percentile). The ADHD-Executive Functioning Index score was 19 (51-75th percentile). These scores suggest the client is not reporting deficits in executive functioning. Client's boyfriend completed the collateral report which indicated discrepant results. His scores were generally higher. He noted deficits in the areas of: self-organization/problem-solving and self-restraint.       CNS Vital Signs Neurocognitive Battery   The CNS Vital Signs Neurocognitive Battery is a remotely-administered assessment comprised of seven core subtests to individually measure the patient's verbal memory, visual memory, motor speed, psychomotor speed, reaction time, focus, ability to sustain attention and " ability to adapt to changing rules and tasks.       Above average domain scores indicate a standard score (SS) greater than 109 or a Percentile Rank (MO) greater than 74, indicating a high functioning test subject. Average is a SS  or MO 25-74, indicating normal function. Low Average is a SS 80-89 or MO 9-24 indicating a slight deficit or impairment. Below Average is a SS 70-79 or MO 2-8, indicating a moderate level of deficit or impairment. Very Low is a SS less than 70 or a MO less than 2, indicating a deficit and impairment.  Validity Indicator denotes a guideline for representing the possibility of an invalid test or domain score, and can be influenced by patient understanding, effort, or other conditions.    The patient's results are detailed below:     Domain Standard Score Percentile Description Validity   Neurocognitive Index 98 45 Average Y   Composite Memory Measure 96 40 Average Y   Verbal Memory 86 18 Low Average Y   Visual Memory 105 63 Average Y   Psychomotor Speed 107 68 Average Y   Reaction Time 88 21 Low Average Y   Complex Attention 101 53 Average Y   Cognitive Flexibility 99 47 Average Y   Processing Speed 95 37 Average Y   Executive Function 97 42 Average Y   Working Memory 105 63 Average Y   Sustained Attention 105 63 Average Y   Simple Attention 96 40 Average Y   Motor Speed 110 75 Above Average Y      Neurocognitive Index (NCI): Measures an average score derived from the domain scores or a general assessment of the overall neurocognitive status of the patient. The patient's NCI score is 98, with a percentile of 45, and falls within the Average range.     Composite Memory: Measures how well subject can recognize, remember, and retrieve words and geometric figures, and is comprised of the Visual and Verbal Memory domains. The patient's Composite Memory score is 96, with a percentile of 40, and falls within the Average range.     Verbal Memory: Measures how well subject can recognize,  remember, and retrieve words. The patient's Verbal Memory score is 86, with a percentile of 18, and falls within the Low Average range.     Visual Memory: Measures how well subject can recognize, remember and retrieve geometric figures. The patient's Visual Memory score is 105, with a percentile of 63, and falls within the Average range.     Psychomotor Speed: Measures how well a subject perceives, attends, responds to complex visual-perceptual information and performs simple fine motor coordination, and is comprised of the Motor Speed and Processing Speed indexes. The patient's Psychomotor Speed score is 107, with a percentile of 68, and falls within the Average range.     Reaction Time: Measures how quickly the subject can react, in milliseconds, to a simple and increasingly complex direction set. The patient's Reaction Time score is 88, with a percentile of 21, and falls within the Low Average range.     Complex Attention: Measures the ability to track and respond to a variety of stimuli over lengthy periods of time and/or perform complex mental tasks requiring vigilance quickly and accurately. The patient's Complex Attention score is 101, with a percentile of 53, and falls within the Average range.     Cognitive Flexibility: Measures how well subject is able to adapt to rapidly changing and increasingly complex set of directions and/or to manipulate the information. The patient's Cognitive Flexibility score is 99, with a percentile of 47, and falls within the Average range.     Processing Speed: Measures how well a subject recognizes and processes information i.e., perceiving, attending/responding to incoming information, motor speed, fine motor coordination, and visual-perceptual ability. The patient's Processing Speed score is 95, with a percentile of 37, and falls within the Average range.     Executive Function: Measures how well a subject recognizes rules, categories, and manages or navigates rapid decision  making. The patient's Executive Function score is 97, with a percentile of 42, and falls within the Average range.     Simple Attention: Measures the ability to track and respond to a single defined stimulus over lengthy periods of time while performing vigilance and response inhibition quickly and accurately to a simple task. The patient's Simple Attention score is 96, with a percentile of 40, and falls within the Average range.     Motor Speed: Measure: Ability to perform simple movements to produce and satisfy an intention towards a manual action and goal. The patient's Motor Speed score is 110, with a percentile of 75, and falls within the Above Average range.  ?   Summary of [place] Multiphasic Personality Inventory--Third Edition    Client completed the [place] Multiphasic Personality Inventory-3 (MMPI-3), a self-report personality inventory, as part of her evaluation. Validity scales indicate that the client was able to comprehend and respond relevantly to the test items. Client responded in an open and consistent manner, resulting in a valid profile. Client reports vague neurological complaints and physical health concerns. She is reporting diffuse cognitive difficulties including problems with memory, confusion and attention and concentration. She reports being passive, indecisive, and inefficacious. She feels she is incapable of making decisions and dealing effectively with crisis situations. She reports an above average level of stress. She is prone to worry and rumination. She engages in compulsive behaviors including repetitive checking.   ?????   Generalized Anxiety Disorder Questionnaire (MAGDA-7)   This questionnaire is designed to screen for anxiety in adults. Based on the client's score of 7, they are reporting mild symptoms of anxiety at this time. Client identified the following symptoms of anxiety: feeling nervous/anxious/on edge; not being able to stop or control worrying; worrying too much  "about different things; restlessness, and becoming easily annoyed or irritable.  ?   Patient Health Questionnaire- 9 (PHQ-9)    This questionnaire is designed to screen for depression in adults. Based on the client's score of 2, they are not reporting symptoms of depression at this time.   ?   Summary (based on clinical interview, review of records, test results):   Client is a 18-year-old, , partnered / significant other female. Client was referred for a diagnostic assessment by PCP. The purpose of this evaluation is to: provide treatment recommendations and clarify diagnosis. Client's presenting concerns include: Client is impulsive with spending and decision making (she will buy things online that she doesn't need (this can feel impulsive). She is neat and organized and puts things away. She misplaces her keys sometimes. Client has always been very fidgety. Her mother has always had to tell her to stop when she is fidgeting. Client can't retain information when she is learning. She has to work \"10 times harder\" to remember information. Once she gets to a test, she has test anxiety and \"it goes right out of my brain once I get to the test.\" As a child, she recalled having to re-read the same page 10 times in order to retain what she read. She has been forgetful lately. When she is talking, she can't remember what she is trying to say, and she will stop to think of the word. She will forget what she is trying to say. With regard to school, it is really hard to learn and retain information. Client does not feel forgetful (I.e., she knows when deadlines are, when appointments are, what the assignments are), but she has difficulty memorizing information when she is studying. Client tries to finish one thing before moving onto the next. However, she might get sidetracked on the way to doing laundry (e.g., finding a plate) and she will stop to address that thing before getting to the laundry. Client likes to " "make plans and has a schedule and structure to her day. She used to procrastinate more when she was younger (e.g., didn't want to get out of bed or clean when she was younger), but now she likes having a \"set day.\" She has difficulty listening when people are talking to her if she is in the middle of doing something on her phone or computer (when she is in the middle of something, she can't focus on the interruption - it is hard to switch gears. She might interrupt people at times, but this isn't a big issue. Sometimes she feels that if she doesn't say something in the moment, she will forget what she is going to say. Client can relax and watch TV shows (when she has free time or feels bored). If a movie is not interesting, she will  her phone. If she isn't interested in a book by the first chapter, she can't get into it. If she likes the book, she can focus and read. Client stated that symptoms have resulted in the following functional impairments: academic performance and management of the household and or completion of tasks. Client reported that she has completed a previous ADHD diagnostic assessment at the age of 6. Client has not received a previous diagnosis of ADHD (her sister was diagnosed when she was 10 years old and they recommended that Client be evaluated now). Client reported that medication has not been prescribed medication to address these problems. Client reported that these problems began college. Client has not attempted to resolve these concerns in the past. Client reported the following previous diagnoses which includes: an Anxiety Disorder. Client reported symptoms began 15. Client reported that she was starting her first year of [employer] (taking college classes). She was stressed about a certain assignment and the same day she had a hockey game and her teammate had a seizure on the ice and her heart stopped. Her friend \"basically  but they brought her back to life with CPR.\" That same " week, her grandmother's health declined and she was moved to a nursing home (she had previously lived with Client and her family for 8 years). Client has received mental health services in the past: medication from PCP and therapy. She started taking Lexapro at age 15. She stopped taking it in 2023 for a while. She restarted this medication in February after her grandmother . 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 Psychiatric Hospitalizations: None.  Client denies a history of civil commitment. Client is receiving other mental health services. These include medications from PCP. Client is prescribed Lexapro by PCP. Client has found medication to be helpful. Client just had her first therapy appointment last week with [facility] therapist, Belinda Donahue. She did not report a personal history of chemical dependence.    Client reported she grew up in [place], [place] and [facility]. She was raised by her biological parents and grandmother. Her parents were always together. She was the second born of two children. She has an older sister. Client reported that their childhood was good. They would get dropped off from school at her grandmother's house. When they were in 4th grade, they moved in with her grandma (to help her financially). When Client was in 11th grade, her grandmother moved into a nursing home. Her family was loving and nurturing. When her sister was in high school, they would argue often. Client described their current relationships with family of origin as good/close. Client reported the following relationship history: one dating relationship. Client's current relationship status is partnered for two years. They get along well. Client identified their sexual orientation as heterosexual. She does not have children. Client identified partner; parents; siblings; pets; other as part of their support system. Client identified the quality of these relationships as  "stable and meaningful.    As a child, client reported that she had problems getting ready for school in the morning and had problems with organization and keeping track of items. He had difficulty getting ready in the morning in high school. Her room was messy in middle school and high school, but it is not messy now. She would misplace things occasionally. Client reported no difficulty with childhood peer relationships.  As a child, client reported having regular and consistent sleep patterns. Client reported currently experiencing regular and consistent sleep patterns. She had a few months where she had difficulty falling asleep (January and February 2024), but this is better now. Client reported sleeping approximately 7-9 hours per night. Client reported that she has not completed a sleep study.       Client's highest education level was high school graduate and some college. Client graduated high school in 2022. She estimated she obtained mostly As and Bs. During the elementary, middle, and high school years, Client recalls academic strengths in the area of reading, writing, and elective food courses and art classes. She also liked social studies. Client reported experiencing academic problems in math and science. Client did not identify any learning problems. Client did not receive tutoring services during the school years. Client did not receive special education services. When Client was in 3rd grade, she had to go in on Wednesdays for extra help with reading. In 4th and 5th grade, she was in a group that met in the mornings \"to help get extra learning in.\" This group met to review different subjects each day. In 6th grade, she had to do \"math support.\" Client reported no particular problems during the school years. In elementary school, she talked more than she was supposed to, but she did not do this in middle school or high school. In elementary school, there were 3 times when she had to be removed from " "the classroom \"to talk about my behavior\" (\"I would do something without thinking - but I grew out of that in 4th grade\"). Client was independent with managing homework. She would get things done on time and hand in assignments. If she missed an assignment, it was rare. She would end her school day, go to soccer or hockey and then go home and do homework. She had a set schedule that helped her to be productive. Sometimes Client would miss the bus in the morning if she didn't get out of bed on time, but she would make it to school on time. Client took notes during class, but didn't always understand and retain information. It was hard to follow along. Client felt distracted if others were talking near her or if there were noises. If she wasn't interested in the topic, she would start thinking about something else. Sometimes she doodled. Client was taking [employer] classes during senior year.     Client did attend post-secondary school. She is at [employer]. She will be transferring in the fall of 2024 to [employer]. She is in her second year of college (but credits-wise she is in her third year due to [employer] credits). She is majoring in exercise science. Client has been obtaining varying grades: in science classes such as biology and chemistry classes, she is struggling (she got a C+ in these classes last semester). In classes that are electives for her major (e.g., motor control, physiology of aging, research methods in exercise science, medical terminology) she is getting As. She does well in reading and writing classes. Client got a B in statistics and anatomy and physiology. Client is attending class regularly. Client is noticing that she can be distracted during class. She is taking notes, but she can't retain information and has to go back to review the notes several times to understand the material (this makes memorizing information for certain classes difficult). Client turns assignments in on time. She is " able to meet deadlines and she keeps a good schedule to give herself enough time for all tasks. She does not feel she is forgetting assignments or procrastinating.      Client reported that she is currently employed part-time. 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 She will be a  at a country club this summer. Client reported that the current job is a good fit for her skills and personality. Client reported that she frequently made mistakes with poor attention to detail and disorganized behavior. Sometimes she will misplace something or forget what someone said after they gave her their order. In her  job, she might make small mistakes like forgetting a side for an order (occasionally). There are 9 minutes of downtime between the lunch and dinner rush so that can be boring. Usually, she is engaged and not bored. 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 Client has not been terminated from a place of employment.        Results of testing were not indicative of ADHD.?Client is not reporting significant symptoms of inattention or hyperactivity/impulsivity at this time. Similarly, her partner did not endorse current symptoms of ADHD. Additionally, neither Client nor her mother reported symptoms of ADHD in childhood. Further, deficits in executive functioning and impairment in functioning were not reported. Client completed a brief virtual assessment examining brief core brain function domains. Results of this assessment demonstrated that, overall, Client's scores fell in the Average range. There were no deficits that would be expected if Client had ADHD. Personality testing was positive for stress and worry. Self-report measures indicate Client is  experiencing mild anxiety at this time.     Based on the results of clinical interview and psychological testing, the client currently meets criteria for a diagnosis of Adjustment Disorder with anxiety. Client will be provided with the results of testing, diagnosis, and recommendations in her last appointment.      DSM5 Diagnoses: (Sustained by DSM5 Criteria Listed Above)      Adjustment Disorder with anxiety (F43.22)     Psychosocial & Contextual Factors: college student     Recommendations:      1. Schedule an appointment with your physician or psychiatrist to discuss a medication evaluation. Medication can be very helpful in treating the symptoms of anxiety.       2. Individual therapy is recommended. Therapies focused on identifying and challenging problematic thought and behavior patterns while increasing the use of healthy coping skills has been found to be effective in treating anxiety. It will be important to set goals in this therapy and work actively toward achieving short-term successes that lead to the completion of each goal. Action-oriented therapies, such as CBT and ACT are particularly recommended for the treatment of chronic anxiety.?   ?   3. The use of behavioral strategies such as diaphragmatic breathing, guided imagery, and mindfulness is often helpful in the management of anxiety symptoms.      4. ?The following compensatory strategies may be useful to cope with reported inattention symptoms:    a. Maintaining a predictable routine and structured environment that incorporates prioritized checklists and reminders (e.g., Post-Its).   b. When completing tasks, try to focus on one task at a time and complete it in its entirety before moving on to the next task.   c. Minimize background distractions when working on complex tasks. For example, TV, radio or ongoing conversations in the background may hinder ability to focus on the task at hand.   d. Take regular breaks from tasks that require  prolonged attention. In general, regular breaks from complex tasks can help prevent lapses in attention, which can result in errors.   e. Outline the steps required to complete a task prior to beginning it, which can help ensure an organized approach. Use the outline to refer to throughout the task as a reminder of the steps to be completed.      ?   Luci Bowles, PhD, LP   Licensed Psychologist

## 2024-04-01 ENCOUNTER — VIRTUAL VISIT (OUTPATIENT)
Dept: PSYCHOLOGY | Facility: CLINIC | Age: 19
End: 2024-04-01
Payer: COMMERCIAL

## 2024-04-01 DIAGNOSIS — F43.22 ADJUSTMENT DISORDER WITH ANXIETY: Primary | ICD-10-CM

## 2024-04-01 PROCEDURE — 96131 PSYCL TST EVAL PHYS/QHP EA: CPT | Mod: 95 | Performed by: PSYCHOLOGIST

## 2024-04-01 PROCEDURE — 96130 PSYCL TST EVAL PHYS/QHP 1ST: CPT | Mod: 95 | Performed by: PSYCHOLOGIST

## 2024-04-01 NOTE — PROGRESS NOTES
Client Name:  Dulce Sharma      Date: 4/1/24      Service Type: Individual (ADHD Evaluation feedback session)   ?   Session Start Time: 10:56 Session End Time: 11:16     ?   Session Length: 20 minutes    ?   Session #: (feedback)   ?   Attendees: Client attended alone     Telemedicine Visit: The patient's condition can be safely assessed and treated via synchronous audio and visual telemedicine encounter.      Reason for Telemedicine Visit: Patient has requested telehealth visit    Originating Site (Patient Location): Patient's place of employment      Distant Location (provider location):  Off-site    Consent:  The patient/guardian has verbally consented to: the potential risks and benefits of telemedicine (video visit) versus in person care; bill my insurance or make self-payment for services provided; and responsibility for payment of non-covered services.     Mode of Communication:  Video Conference via Alfred    ?   ?   DATA   ?   ?   Treatment Objective(s) Addressed in This Session:    Provided feedback on ADHD evaluation. Reviewed test results in depth and answered client's questions. Client diagnosed with Adjustment Disorder with anxiety. This provider also completed full written report of evaluation, including integration of testing data, summary, and recommendations. Please see Documentation Only dated 3/27/24.   ?   Progress on / Status of Treatment Objective(s) / Homework:    Completed    ?   Intervention:   ADHD Evaluation feedback; Reviewed report (can be found in Documentation Only encounter dated 3/27/24); Client was appreciative of the feedback and expressed understanding of the diagnosis.    ?   ?   ASSESSMENT: Current Emotional / Mental Status (status of significant symptoms):   Risk status (Self / Other harm or suicidal ideation)   Client denies current fears or concerns for personal safety.   Client denies current or recent suicidal ideation or behaviors.   Client denies current or  recent homicidal ideation or behaviors.   Client denies current or recent self-injurious behavior or ideation.   Client denies other safety concerns.   A safety and risk management plan has not been developed at this time, however client was given the after-hours number / 911 should there be a change in any of these risk factors.   ?   Appearance: Appropriate  Eye Contact: Good   Psychomotor Behavior: Normal   Attitude: Cooperative    Orientation: All   Speech   Rate / Production: Normal    Volume: Normal    Mood: Normal   Affect: Appropriate    Thought Content: Clear    Thought Form: Coherent Logical    Insight: Good    ?   Medication Review:   Client is prescribed Lexapro by PCP     Medication Compliance:   Yes  ?   Changes in Health Issues:   None reported   ?   Chemical Use Review:   Substance Use: Chemical use reviewed, no active concerns identified  ?   Tobacco Use: No current tobacco use.    ?   Collateral Reports Completed:   Routed note to Care Team Member(s)   ?   PLAN: (Homework, other)   ?   Recommendations:      1. Schedule an appointment with your physician or psychiatrist to discuss a medication evaluation. Medication can be very helpful in treating the symptoms of anxiety and depression.       2. Individual therapy is recommended. Therapies focused on identifying and challenging problematic thought and behavior patterns while increasing the use of healthy coping skills has been found to be effective in treating anxiety and depression. It will be important to set goals in this therapy and work actively toward achieving short-term successes that lead to the completion of each goal. Action-oriented therapies, such as CBT and ACT are particularly recommended for the treatment of chronic anxiety and depression.?   ?   3. The use of behavioral strategies such as diaphragmatic breathing, guided imagery, and mindfulness is often helpful in the management of anxiety symptoms.      4. ?The following  compensatory strategies may be useful to cope with reported inattention symptoms:    a. Maintaining a predictable routine and structured environment that incorporates prioritized checklists and reminders (e.g., Post-Its).   b. When completing tasks, try to focus on one task at a time and complete it in its entirety before moving on to the next task.   c. Minimize background distractions when working on complex tasks. For example, TV, radio or ongoing conversations in the background may hinder ability to focus on the task at hand.   d. Take regular breaks from tasks that require prolonged attention. In general, regular breaks from complex tasks can help prevent lapses in attention, which can result in errors.   e. Outline the steps required to complete a task prior to beginning it, which can help ensure an organized approach. Use the outline to refer to throughout the task as a reminder of the steps to be completed.      ?   Luci Bowles, PhD,    Licensed Psychologist       Psychological Testing     Billing/Services Summary    ?    Testing Evaluation Services  Base: 30722   (1st 60 mins)  Add-on: 82361   (each addtl 60 mins)    Record Review and Clarify Referral Question    (9:30/9:50), (3/11/24)  20 minutes    Integration/Report Generation  (3:00/4:00), (3/12/24) - MMPI-3   (12:00/1:00), (3/20/24) - Rylee Scales  (12:00/1:00), (3/26/24) - CNS Vital Signs  (3:00/4:00), (3/27/24) - Report Writing  60 minutes  60 minutes  60 minutes  60 minutes    Interactive Feedback Session   (11:00/11:20), (4/1/24)  20 minutes    Total Time:  280 minutes (4 hours, 40 minutes)    Total Units:  1  4   ?    ?    Diagnosis(es): (ICD-10)   Adjustment Disorder with anxiety (F43.22)

## 2024-04-05 ENCOUNTER — MYC MEDICAL ADVICE (OUTPATIENT)
Dept: FAMILY MEDICINE | Facility: CLINIC | Age: 19
End: 2024-04-05
Payer: COMMERCIAL

## 2024-04-05 DIAGNOSIS — F41.1 GAD (GENERALIZED ANXIETY DISORDER): Primary | ICD-10-CM

## 2024-04-05 RX ORDER — ESCITALOPRAM OXALATE 20 MG/1
20 TABLET ORAL DAILY
Qty: 30 TABLET | Refills: 1 | Status: SHIPPED | OUTPATIENT
Start: 2024-04-05 | End: 2024-05-06

## 2024-04-05 ASSESSMENT — ANXIETY QUESTIONNAIRES
8. IF YOU CHECKED OFF ANY PROBLEMS, HOW DIFFICULT HAVE THESE MADE IT FOR YOU TO DO YOUR WORK, TAKE CARE OF THINGS AT HOME, OR GET ALONG WITH OTHER PEOPLE?: VERY DIFFICULT
GAD7 TOTAL SCORE: 17
GAD7 TOTAL SCORE: 17
3. WORRYING TOO MUCH ABOUT DIFFERENT THINGS: NEARLY EVERY DAY
4. TROUBLE RELAXING: NEARLY EVERY DAY
6. BECOMING EASILY ANNOYED OR IRRITABLE: MORE THAN HALF THE DAYS
7. FEELING AFRAID AS IF SOMETHING AWFUL MIGHT HAPPEN: SEVERAL DAYS
7. FEELING AFRAID AS IF SOMETHING AWFUL MIGHT HAPPEN: SEVERAL DAYS
5. BEING SO RESTLESS THAT IT IS HARD TO SIT STILL: MORE THAN HALF THE DAYS
GAD7 TOTAL SCORE: 17
IF YOU CHECKED OFF ANY PROBLEMS ON THIS QUESTIONNAIRE, HOW DIFFICULT HAVE THESE PROBLEMS MADE IT FOR YOU TO DO YOUR WORK, TAKE CARE OF THINGS AT HOME, OR GET ALONG WITH OTHER PEOPLE: VERY DIFFICULT
1. FEELING NERVOUS, ANXIOUS, OR ON EDGE: NEARLY EVERY DAY
2. NOT BEING ABLE TO STOP OR CONTROL WORRYING: NEARLY EVERY DAY

## 2024-04-05 ASSESSMENT — PATIENT HEALTH QUESTIONNAIRE - PHQ9
10. IF YOU CHECKED OFF ANY PROBLEMS, HOW DIFFICULT HAVE THESE PROBLEMS MADE IT FOR YOU TO DO YOUR WORK, TAKE CARE OF THINGS AT HOME, OR GET ALONG WITH OTHER PEOPLE: VERY DIFFICULT
SUM OF ALL RESPONSES TO PHQ QUESTIONS 1-9: 7
SUM OF ALL RESPONSES TO PHQ QUESTIONS 1-9: 7

## 2024-04-18 ENCOUNTER — VIRTUAL VISIT (OUTPATIENT)
Dept: PSYCHOLOGY | Facility: CLINIC | Age: 19
End: 2024-04-18
Payer: COMMERCIAL

## 2024-04-18 DIAGNOSIS — F43.22 ADJUSTMENT DISORDER WITH ANXIETY: Primary | ICD-10-CM

## 2024-04-18 PROCEDURE — 99207 PR NO BILLABLE SERVICE THIS VISIT: CPT | Mod: 95

## 2024-04-18 NOTE — PROGRESS NOTES
4/18/2024    Patient presented to session briefly. Patient reporting improvement in anxiety symptoms and does not have anything to process for today's session. Patient rescheduled upcoming appointment after she is completed with finals for the school year.     Patient and therapist to meet on 5/14/24.     Belinda WAKEFIELD, LICSW   4/18/2024

## 2024-05-04 ASSESSMENT — ANXIETY QUESTIONNAIRES
3. WORRYING TOO MUCH ABOUT DIFFERENT THINGS: SEVERAL DAYS
2. NOT BEING ABLE TO STOP OR CONTROL WORRYING: SEVERAL DAYS
GAD7 TOTAL SCORE: 6
4. TROUBLE RELAXING: SEVERAL DAYS
GAD7 TOTAL SCORE: 6
7. FEELING AFRAID AS IF SOMETHING AWFUL MIGHT HAPPEN: NOT AT ALL
8. IF YOU CHECKED OFF ANY PROBLEMS, HOW DIFFICULT HAVE THESE MADE IT FOR YOU TO DO YOUR WORK, TAKE CARE OF THINGS AT HOME, OR GET ALONG WITH OTHER PEOPLE?: SOMEWHAT DIFFICULT
5. BEING SO RESTLESS THAT IT IS HARD TO SIT STILL: SEVERAL DAYS
7. FEELING AFRAID AS IF SOMETHING AWFUL MIGHT HAPPEN: NOT AT ALL
IF YOU CHECKED OFF ANY PROBLEMS ON THIS QUESTIONNAIRE, HOW DIFFICULT HAVE THESE PROBLEMS MADE IT FOR YOU TO DO YOUR WORK, TAKE CARE OF THINGS AT HOME, OR GET ALONG WITH OTHER PEOPLE: SOMEWHAT DIFFICULT
1. FEELING NERVOUS, ANXIOUS, OR ON EDGE: SEVERAL DAYS
6. BECOMING EASILY ANNOYED OR IRRITABLE: SEVERAL DAYS

## 2024-05-06 ENCOUNTER — VIRTUAL VISIT (OUTPATIENT)
Dept: FAMILY MEDICINE | Facility: CLINIC | Age: 19
End: 2024-05-06
Payer: COMMERCIAL

## 2024-05-06 DIAGNOSIS — F41.1 GAD (GENERALIZED ANXIETY DISORDER): ICD-10-CM

## 2024-05-06 PROCEDURE — 99213 OFFICE O/P EST LOW 20 MIN: CPT | Mod: 95 | Performed by: FAMILY MEDICINE

## 2024-05-06 RX ORDER — ESCITALOPRAM OXALATE 20 MG/1
20 TABLET ORAL DAILY
Qty: 90 TABLET | Refills: 1 | Status: SHIPPED | OUTPATIENT
Start: 2024-05-06

## 2024-05-06 ASSESSMENT — ENCOUNTER SYMPTOMS: NERVOUS/ANXIOUS: 1

## 2024-05-06 NOTE — PROGRESS NOTES
"Dulce is a 18 year old who is being evaluated via a billable video visit.          A/p:      ICD-10-CM    1. MAGDA (generalized anxiety disorder)  F41.1         Improved on higher dose of lexapro. No side effects.  Continue current med and therapy.  Reach out for concerns.  Follow-up 6 months, sooner if needed    Subjective   Dulce is a 18 year old, presenting for the following health issues:  Anxiety        5/6/2024     8:37 AM   Additional Questions   Roomed by Johanny DAWKINS   Accompanied by Self     Anxiety    History of Present Illness       Mental Health Follow-up:  Patient presents to follow-up on Anxiety.    Patient's anxiety since last visit has been:  Better  The patient is not having other symptoms associated with anxiety.  Any significant life events: No  Patient is not feeling anxious or having panic attacks.  Patient has no concerns about alcohol or drug use.    She eats 2-3 servings of fruits and vegetables daily.She consumes 2 sweetened beverage(s) daily.She exercises with enough effort to increase her heart rate 30 to 60 minutes per day.  She exercises with enough effort to increase her heart rate 3 or less days per week.   She is taking medications regularly.       Feels like things have been quite a bit better on the higher dose of lexapro. Has not noted any side effect concerns.    Has noted less \"constant worry\". Was having trouble falling asleep and \"over thinking\" things.  Dose adjustment has let her relax, no longer \"over thinking\".    Feels symptoms are completely controlled at this dose.    Sleep has been improved as well.  No longer having trouble falling asleep or staying asleep.      Still doing therapy, has been gradually increasing time between visits.  Finishing up finals now and then will meet with them cecy.            Review of Systems  Constitutional, HEENT, cardiovascular, pulmonary, gi and gu systems are negative, except as otherwise noted.      Objective           Vitals:  No vitals " were obtained today due to virtual visit.    Physical Exam   GENERAL: alert and no distress  EYES: Eyes grossly normal to inspection.  No discharge or erythema, or obvious scleral/conjunctival abnormalities.  RESP: No audible wheeze, cough, or visible cyanosis.    SKIN: Visible skin clear. No significant rash, abnormal pigmentation or lesions.  NEURO: Cranial nerves grossly intact.  Mentation and speech appropriate for age.  PSYCH: Appropriate affect, tone, and pace of words    Epic reviewed      Video-Visit Details    Type of service:  Video Visit   Originating Location (pt. Location): Home    Distant Location (provider location):  On-site  Platform used for Video Visit: Haydee  Signed Electronically by: Samantha Boone DO

## 2024-12-27 ENCOUNTER — HOSPITAL ENCOUNTER (EMERGENCY)
Facility: HOSPITAL | Age: 19
Discharge: HOME HEALTH | End: 2024-12-27
Payer: COMMERCIAL

## 2024-12-27 VITALS
BODY MASS INDEX: 20.04 KG/M2 | DIASTOLIC BLOOD PRESSURE: 50 MMHG | OXYGEN SATURATION: 100 % | HEIGHT: 70 IN | RESPIRATION RATE: 18 BRPM | WEIGHT: 140 LBS | HEART RATE: 56 BPM | TEMPERATURE: 97.5 F | SYSTOLIC BLOOD PRESSURE: 90 MMHG

## 2024-12-27 DIAGNOSIS — Z20.3 NEED FOR POST EXPOSURE PROPHYLAXIS FOR RABIES: ICD-10-CM
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
## 2024-12-27 PROCEDURE — 90715 TDAP VACCINE 7 YRS/> IM: CPT

## 2024-12-27 PROCEDURE — 90471 IMMUNIZATION ADMIN: CPT

## 2024-12-27 PROCEDURE — 250N000011 HC RX IP 250 OP 636

## 2024-12-27 PROCEDURE — 90472 IMMUNIZATION ADMIN EACH ADD: CPT

## 2024-12-27 PROCEDURE — 90675 RABIES VACCINE IM: CPT

## 2024-12-27 PROCEDURE — 99284 EMERGENCY DEPT VISIT MOD MDM: CPT | Mod: 25

## 2024-12-27 PROCEDURE — 90375 RABIES IG IM/SC: CPT

## 2024-12-27 PROCEDURE — 96372 THER/PROPH/DIAG INJ SC/IM: CPT

## 2024-12-27 RX ADMIN — RABIES IMMUNE GLOBULIN (HUMAN) 1200 UNITS: 300 INJECTION, SOLUTION INFILTRATION; INTRAMUSCULAR at 15:55

## 2024-12-27 RX ADMIN — CLOSTRIDIUM TETANI TOXOID ANTIGEN (FORMALDEHYDE INACTIVATED), CORYNEBACTERIUM DIPHTHERIAE TOXOID ANTIGEN (FORMALDEHYDE INACTIVATED), BORDETELLA PERTUSSIS TOXOID ANTIGEN (GLUTARALDEHYDE INACTIVATED), BORDETELLA PERTUSSIS FILAMENTOUS HEMAGGLUTININ ANTIGEN (FORMALDEHYDE INACTIVATED), BORDETELLA PERTUSSIS PERTACTIN ANTIGEN, AND BORDETELLA PERTUSSIS FIMBRIAE 2/3 ANTIGEN 0.5 ML: 5; 2; 2.5; 5; 3; 5 INJECTION, SUSPENSION INTRAMUSCULAR at 15:52

## 2024-12-27 RX ADMIN — RABIES VACCINE 1 ML: KIT at 16:02

## 2024-12-27 ASSESSMENT — ACTIVITIES OF DAILY LIVING (ADL)
ADLS_ACUITY_SCORE: 41
ADLS_ACUITY_SCORE: 41

## 2024-12-27 ASSESSMENT — COLUMBIA-SUICIDE SEVERITY RATING SCALE - C-SSRS
1. IN THE PAST MONTH, HAVE YOU WISHED YOU WERE DEAD OR WISHED YOU COULD GO TO SLEEP AND NOT WAKE UP?: NO
6. HAVE YOU EVER DONE ANYTHING, STARTED TO DO ANYTHING, OR PREPARED TO DO ANYTHING TO END YOUR LIFE?: NO
2. HAVE YOU ACTUALLY HAD ANY THOUGHTS OF KILLING YOURSELF IN THE PAST MONTH?: NO

## 2024-12-27 NOTE — DISCHARGE INSTRUCTIONS
You were seen in the emergency department for evaluation after cat bite from a stray cat.  Because we are unable to isolate the cat and we are unsure of the vaccination status we went forward with the rabies vaccine.  You will get a phone call to schedule the next 3 vaccine appointments.  He can also call your primary care provider to see if they will be able to give you the vaccine at their clinic.     Use Tylenol and ibuprofen for pain. You may take 600 mg of ibuprofen (Advil) every 6 hours and 650 mg acetaminophen (Tylenol) every 6 hours for pain. Do not take more than 3200 mg of ibuprofen (Advil) in 24 hours. Do not take more than 3000 mg of acetaminophen (Tylenol) in 24 hours. You may take this much for 1-3 days, then only use as needed.     Watch for signs of infection.  Return to the emergency department if you get redness streaking up your arm, drainage that smells bad or looks like pus, fever, or any other concerning symptoms.

## 2024-12-27 NOTE — ED TRIAGE NOTES
Pt bit by a stray cat at work. Went to  and told to come to the ED. Unknown if cat had shots. Denies pain. Cleaned wound with alcohol     Triage Assessment (Adult)       Row Name 12/27/24 8910          Triage Assessment    Airway WDL WDL

## 2024-12-27 NOTE — ED PROVIDER NOTES
Emergency Department Encounter   NAME: Dulce Sharma  AGE: 19 year old female  YOB: 2005  MRN: [id number]    PCP: Samantha Boone  ED PROVIDER: Kimberly Monroy PA-C    Evaluation Date & Time:   No admission date for patient encounter.    CHIEF COMPLAINT:  Animal Bite      Impression and Plan   MDM: 19-year-old female with no pertinent history presents for evaluation of cat bite.  Was bit by a stray cat today.  On her left thumb.  On arrival here patient is vitally stable.  Afebrile.  On exam patient is in no acute distress.  Hand and thumb are neurovascularly intact.  No evidence of compartment syndrome.  Full range of motion.  Low suspicion for underlying fracture.  Not consistent with compartment syndrome or tendinous injury.  No signs of infection.  No palpable underlying foreign body and low suspicion for this.  We discussed updating her tetanus here today as the last one was in 2016 as well as starting her rabies vaccine which she is agreeable to.  I do not think that any labs or imaging are indicated at this time which patient is agreeable to.    Injected half of the immunoglobulin near her wounds and the rest was injected IM by RN.  Patient tolerated the procedure well.  We discussed wound care and signs of infection.  I prescribed her Augmentin for infection prophylaxis of cat bite.  She is going to follow-up for her remaining rabies vaccines.  She will also follow-up with her primary care provider.  We reviewed strict return precautions and patient was discharged home in stable condition.         Medical Decision Making  Obtained supplemental history:Supplemental history obtained?: No  Reviewed external records: External records reviewed?: Documented in chart and Other: [facility]: tetanus 2016  Care impacted by chronic illness:Documented in Chart  Did you consider but not order tests?: Work up considered but not performed and documented in chart, if applicable  Did you interpret images  independently?: Independent interpretation of ECG and images noted in documentation, when applicable.  Consultation discussion with other provider:Did you involve another provider (consultant, , pharmacy, etc.)?: No  Discharge. I prescribed additional prescription strength medication(s) as charted. N/A.    MIPS: Not Applicable        FINAL IMPRESSION:    ICD-10-CM    1. Cat bite, initial encounter  W55.01XA       2. Need for post exposure prophylaxis for rabies  Z20.3             MEDICATIONS GIVEN IN THE EMERGENCY DEPARTMENT:  Medications   rabies immune globulin 300 units/mL (HYPERAB) injection 1,200 Units (1,200 Units Intramuscular $Given 12/27/24 1555)   rabies vaccine, PCEC (chick embryo derived) (RABAVERT) injection 1 mL (1 mL Intramuscular $Given 12/27/24 1602)   Tdap (tetanus-diphtheria-acell pertussis) (ADACEL) injection 0.5 mL (0.5 mLs Intramuscular $Given 12/27/24 1552)         NEW PRESCRIPTIONS STARTED AT TODAY'S ED VISIT:  Discharge Medication List as of 12/27/2024  4:51 PM            HPI   Patient information was obtained from: patient   Use of Intrepreter: N/A     Dulce Sharma is a 19 year old female with no pertinent history who presents to the ED by car for evaluation of cat bite.  Patient was at work and tried to get some stray cats from the back of the office.  She got bit on her left thumb.  She denies any pain.  She has been moving the thumb well.  No numbness or tingling.  Though the cat is a stray so she is unsure if it has had shots.  She is unsure of her last tetanus shot.  No fever.      REVIEW OF SYSTEMS:  Pertinent positive and negative symptoms per HPI.       Physical Exam     First Vitals:  Patient Vitals for the past 24 hrs:   BP Temp Temp src Pulse Resp SpO2 Height Weight   12/27/24 1650 90/50 -- -- -- -- -- -- --   12/27/24 1648 -- 97.5  F (36.4  C) Temporal -- -- -- -- --   12/27/24 1646 (!) 89/54 -- -- 56 -- 100 % -- --   12/27/24 1645 -- -- -- 52 -- 100 % -- --   12/27/24  "1621 -- -- -- 54 -- 100 % -- --   12/27/24 1609 100/60 -- -- 50 -- -- -- --   12/27/24 1539 -- -- -- 65 -- 97 % -- --   12/27/24 1524 -- -- -- 67 -- 100 % -- --   12/27/24 1351 118/71 98  F (36.7  C) Oral 73 18 99 % 1.778 m (5' 10\") 63.5 kg (140 lb)       PHYSICAL EXAM:   General Appearance:  Alert, cooperative, no distress, appears stated age  HENT: Normocephalic without obvious deformity, atraumatic. Mucous membranes moist   Eyes: Conjunctiva clear, Lids normal. No discharge.   Respiratory: No distress. Lungs clear to ausculation bilaterally. No wheezes, rhonchi or stridor  Cardiovascular: Brisk cap refill  Musculoskeletal: Moving all extremities. No gross deformities  Left hand: Full range of motion of the thumb.  No tenderness palpation of the hand or fingers.  Integument: Warm, dry, no rashes.  Left thumb as pictured below: Superficial bite/scratch on the thumb.  No palpable foreign body.  No underlying crepitus or step-offs.  No excessive warmth, erythema, lymphangitis, drainage.          Neurologic: Alert and orientated x3.  Sensation intact to light touch throughout all distributions in the left hand.  Psych: Normal mood and affect      Results     LAB:  All pertinent labs reviewed and interpreted  Labs Ordered and Resulted from Time of ED Arrival to Time of ED Departure - No data to display    RADIOLOGY:  No orders to display       IDavis, am serving as a scribe to document services personally performed by Kimberly Monroy PA-C, based on my observation and the provider's statements to me. I, Kimberly Monroy PA-C attest that Davis Negro is acting in a scribe capacity, has observed my performance of the services and has documented them in accordance with my direction.       Kimberly Monroy PA-C   Emergency Medicine   [facility] EMERGENCY DEPARTMENT       Kimberly Monroy PA-C  12/27/24 2023    "

## 2024-12-30 ENCOUNTER — IMMUNIZATION (OUTPATIENT)
Dept: PEDIATRICS | Facility: CLINIC | Age: 19
End: 2024-12-30
Payer: COMMERCIAL
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
## 2024-12-30 PROCEDURE — 90471 IMMUNIZATION ADMIN: CPT

## 2024-12-30 PROCEDURE — 90675 RABIES VACCINE IM: CPT

## 2024-12-30 NOTE — PROGRESS NOTES
Pt presents for rabies vaccine. No concerns for infection. Educated to continue to watch for signs of infection.

## 2025-01-10 ENCOUNTER — TELEPHONE (OUTPATIENT)
Dept: FAMILY MEDICINE | Facility: CLINIC | Age: 20
End: 2025-01-10
Payer: COMMERCIAL

## 2025-01-10 NOTE — TELEPHONE ENCOUNTER
LUCRECIA. Pt scheduled at [facility] for rabies vaccine. If pt calls back, she can come anytime prior to 5pm.

## 2025-01-14 ENCOUNTER — PATIENT OUTREACH (OUTPATIENT)
Dept: CARE COORDINATION | Facility: CLINIC | Age: 20
End: 2025-01-14
Payer: COMMERCIAL

## 2025-01-28 ENCOUNTER — PATIENT OUTREACH (OUTPATIENT)
Dept: CARE COORDINATION | Facility: CLINIC | Age: 20
End: 2025-01-28
Payer: COMMERCIAL

## 2025-03-29 ENCOUNTER — HEALTH MAINTENANCE LETTER (OUTPATIENT)
Age: 20
End: 2025-03-29

## 2025-03-29 ENCOUNTER — MYC MEDICAL ADVICE (OUTPATIENT)
Dept: FAMILY MEDICINE | Facility: CLINIC | Age: 20
End: 2025-03-29
Payer: COMMERCIAL

## 2025-03-29 DIAGNOSIS — Z11.1 SCREENING EXAMINATION FOR PULMONARY TUBERCULOSIS: Primary | ICD-10-CM

## 2025-04-08 ENCOUNTER — LAB (OUTPATIENT)
Dept: LAB | Facility: CLINIC | Age: 20
End: 2025-04-08
Payer: COMMERCIAL

## 2025-04-08 DIAGNOSIS — Z11.1 SCREENING EXAMINATION FOR PULMONARY TUBERCULOSIS: ICD-10-CM

## 2025-04-08 PROCEDURE — 36415 COLL VENOUS BLD VENIPUNCTURE: CPT

## 2025-04-08 PROCEDURE — 86481 TB AG RESPONSE T-CELL SUSP: CPT

## 2025-04-09 LAB
GAMMA INTERFERON BACKGROUND BLD IA-ACNC: 0.02 IU/ML
M TB IFN-G BLD-IMP: NEGATIVE
M TB IFN-G CD4+ BCKGRND COR BLD-ACNC: 6.51 IU/ML
MITOGEN IGNF BCKGRD COR BLD-ACNC: 0 IU/ML
MITOGEN IGNF BCKGRD COR BLD-ACNC: 0.01 IU/ML
QUANTIFERON MITOGEN: 6.53 IU/ML
QUANTIFERON NIL TUBE: 0.02 IU/ML
QUANTIFERON TB1 TUBE: 0.02 IU/ML
QUANTIFERON TB2 TUBE: 0.03

## 2025-08-12 ENCOUNTER — PATIENT OUTREACH (OUTPATIENT)
Dept: CARE COORDINATION | Facility: CLINIC | Age: 20
End: 2025-08-12
Payer: COMMERCIAL